# Patient Record
Sex: FEMALE | Race: WHITE | NOT HISPANIC OR LATINO | Employment: UNEMPLOYED | URBAN - NONMETROPOLITAN AREA
[De-identification: names, ages, dates, MRNs, and addresses within clinical notes are randomized per-mention and may not be internally consistent; named-entity substitution may affect disease eponyms.]

---

## 2020-07-09 ENCOUNTER — CLINICAL SUPPORT (OUTPATIENT)
Dept: CARDIOLOGY | Facility: CLINIC | Age: 35
End: 2020-07-09

## 2020-07-09 ENCOUNTER — OFFICE VISIT (OUTPATIENT)
Dept: CARDIOLOGY | Facility: CLINIC | Age: 35
End: 2020-07-09

## 2020-07-09 VITALS
DIASTOLIC BLOOD PRESSURE: 88 MMHG | WEIGHT: 154.2 LBS | HEART RATE: 74 BPM | OXYGEN SATURATION: 99 % | BODY MASS INDEX: 24.78 KG/M2 | HEIGHT: 66 IN | TEMPERATURE: 98.4 F | SYSTOLIC BLOOD PRESSURE: 134 MMHG

## 2020-07-09 DIAGNOSIS — R07.2 PRECORDIAL PAIN: ICD-10-CM

## 2020-07-09 DIAGNOSIS — F17.211 CIGARETTE NICOTINE DEPENDENCE IN REMISSION: ICD-10-CM

## 2020-07-09 DIAGNOSIS — Z95.0 PACEMAKER: ICD-10-CM

## 2020-07-09 DIAGNOSIS — I10 ESSENTIAL HYPERTENSION: ICD-10-CM

## 2020-07-09 DIAGNOSIS — G47.33 OSA (OBSTRUCTIVE SLEEP APNEA): ICD-10-CM

## 2020-07-09 DIAGNOSIS — R00.2 PALPITATIONS: Primary | ICD-10-CM

## 2020-07-09 DIAGNOSIS — R53.83 FATIGUE, UNSPECIFIED TYPE: ICD-10-CM

## 2020-07-09 DIAGNOSIS — I49.5 SSS (SICK SINUS SYNDROME) (HCC): Primary | ICD-10-CM

## 2020-07-09 DIAGNOSIS — R06.02 SHORTNESS OF BREATH: ICD-10-CM

## 2020-07-09 PROCEDURE — 93288 INTERROG EVL PM/LDLS PM IP: CPT | Performed by: INTERNAL MEDICINE

## 2020-07-09 PROCEDURE — 93000 ELECTROCARDIOGRAM COMPLETE: CPT | Performed by: SPECIALIST

## 2020-07-09 PROCEDURE — 99213 OFFICE O/P EST LOW 20 MIN: CPT | Performed by: SPECIALIST

## 2020-07-09 RX ORDER — GABAPENTIN 800 MG/1
800 TABLET ORAL 3 TIMES DAILY
COMMUNITY
End: 2022-04-25 | Stop reason: ALTCHOICE

## 2020-07-09 RX ORDER — PRAMIPEXOLE DIHYDROCHLORIDE 0.25 MG/1
0.5 TABLET ORAL
COMMUNITY
End: 2022-04-25 | Stop reason: ALTCHOICE

## 2020-07-09 RX ORDER — METOPROLOL TARTRATE 50 MG/1
50 TABLET, FILM COATED ORAL 2 TIMES DAILY
COMMUNITY
End: 2020-07-09 | Stop reason: SDUPTHER

## 2020-07-09 RX ORDER — METOPROLOL TARTRATE 50 MG/1
50 TABLET, FILM COATED ORAL 2 TIMES DAILY
Qty: 180 TABLET | Refills: 1 | Status: SHIPPED | OUTPATIENT
Start: 2020-07-09 | End: 2021-04-27 | Stop reason: SDUPTHER

## 2020-07-09 RX ORDER — CYCLOBENZAPRINE HCL 10 MG
10 TABLET ORAL 3 TIMES DAILY PRN
COMMUNITY
End: 2022-04-25 | Stop reason: ALTCHOICE

## 2020-07-09 NOTE — PROGRESS NOTES
Subjective   Follow up, pacemaker , Hypertenson  Antonette Martinez is a 35 y.o. female who presents to day for Irregular Heart Beat (EVALUATE) and Med Management (LIST).    CHIEF COMPLIANT  Chief Complaint   Patient presents with   • Irregular Heart Beat     EVALUATE   • Med Management     LIST       Active Problems:  Problem List Items Addressed This Visit        Cardiovascular and Mediastinum    Palpitations - Primary    Pacemaker    Essential hypertension    Relevant Medications    metoprolol tartrate (LOPRESSOR) 50 MG tablet       Respiratory    Shortness of breath    YARITZA (obstructive sleep apnea)       Nervous and Auditory    Precordial pain          HPI  HPI  He noticed recently that she is having more palpitations fast palpitations which happens about 2-3 times a day and loss of times with this palpitations she will have also chest tightness as if some one is sitting in her chest.  The palpitation takes few minutes to resolve sometimes she has to take a deep breath and to try to relax to get them off the palpitations sometimes start suddenly and sometimes starts as if that she is taken off with a speeding up it feels irregular and fades away slowly she also gets short of breath and dizziness with ringing in the ears during these episodes he feels very fatigued all of the time feels sleepy the insurance has taken her CPAP machine away she woke up few times choking  PRIOR MEDS  Current Outpatient Medications on File Prior to Visit   Medication Sig Dispense Refill   • cyclobenzaprine (FLEXERIL) 10 MG tablet Take 10 mg by mouth 3 (Three) Times a Day As Needed for Muscle Spasms.     • gabapentin (Neurontin) 800 MG tablet Take 800 mg by mouth 3 (Three) Times a Day.     • metoprolol tartrate (LOPRESSOR) 50 MG tablet Take 50 mg by mouth 2 (Two) Times a Day.     • pramipexole (MIRAPEX) 0.25 MG tablet Take 0.5 mg by mouth every night at bedtime.       No current facility-administered medications on file prior to visit.         ALLERGIES  Ambien [zolpidem tartrate]    HISTORY  Past Medical History:   Diagnosis Date   • Cardiac arrhythmia    • DDD (degenerative disc disease), lumbar    • Fibromyalgia    • Osteoporosis    • Pacemaker 2011       Social History     Socioeconomic History   • Marital status: Unknown     Spouse name: Not on file   • Number of children: Not on file   • Years of education: Not on file   • Highest education level: Not on file   Tobacco Use   • Smoking status: Former Smoker   • Smokeless tobacco: Never Used   Substance and Sexual Activity   • Alcohol use: Never     Frequency: Never   • Drug use: Never       Family History   Problem Relation Age of Onset   • Hypertension Mother    • Heart disease Mother    • Hypertension Father    • Hyperlipidemia Father    • Heart attack Maternal Grandmother    • Heart disease Maternal Grandmother    • Heart disease Maternal Grandfather    • Heart failure Maternal Grandfather    • Hypertension Maternal Grandfather        Review of Systems   Constitutional: Positive for fatigue. Negative for activity change and appetite change.        HEADACHES   HENT: Positive for tinnitus. Negative for congestion.    Eyes: Negative.  Negative for discharge and itching.   Respiratory: Positive for chest tightness and shortness of breath. Negative for apnea and choking.    Cardiovascular: Positive for palpitations. Negative for leg swelling.        SHORTNESS OF BREATH, SWELLING OF HANDS FEET AND ANKLES   Gastrointestinal: Negative.  Negative for abdominal distention and abdominal pain.   Endocrine: Positive for cold intolerance and heat intolerance.        EXCESSIVE THIRST   Genitourinary: Negative for flank pain.   Musculoskeletal: Negative.  Negative for neck stiffness.   Skin: Negative for color change and pallor.        BREAST LUMP   Allergic/Immunologic: Negative for immunocompromised state.        NOVOCAIN, AMBIEN   Neurological: Positive for dizziness, light-headedness, numbness and  "headaches.   Hematological: Bruises/bleeds easily.        PAST TRANSFUSION, ANEMIA   Psychiatric/Behavioral: The patient is nervous/anxious.         MEMORY LOSS       Objective     VITALS: /88   Pulse 74   Temp 98.4 °F (36.9 °C)   Ht 166.4 cm (65.5\")   Wt 69.9 kg (154 lb 3.2 oz)   SpO2 99%   BMI 25.27 kg/m²     LABS:   Lab Results (most recent)     None          IMAGING:   No Images in the past 120 days found..    EXAM:  Physical Exam   Constitutional: She is oriented to person, place, and time. She appears well-developed and well-nourished.   HENT:   Head: Normocephalic and atraumatic.   Eyes: Pupils are equal, round, and reactive to light.   Neck: Neck supple. No JVD present. No thyromegaly present.   Cardiovascular: Normal rate, regular rhythm, normal heart sounds and intact distal pulses. Exam reveals no gallop and no friction rub.   No murmur heard.  Pacer pocket clean and non tender   Pulmonary/Chest: Effort normal and breath sounds normal. No stridor. No respiratory distress. She has no wheezes. She has no rales. She exhibits no tenderness.   Musculoskeletal: She exhibits no edema, tenderness or deformity.   Neurological: She is alert and oriented to person, place, and time. No cranial nerve deficit. Coordination normal.   Skin: Skin is warm and dry.   Psychiatric: She has a normal mood and affect.   Vitals reviewed.      Procedure     ECG 12 Lead  Date/Time: 7/9/2020 10:00 AM  Performed by: Tami Gregory MD  Authorized by: Tami Gregory MD           EKG: Pacer atrial responses with diffuse nonspecific ST changes, no previous EKG for comparison is available       Assessment/Plan    Diagnosis Plan   1. Palpitations     2. Pacemaker     3. Precordial pain     4. Shortness of breath     5. Essential hypertension     6. YARITZA (obstructive sleep apnea)     1. Will get event monitor for assessment  2. Will proceed with stress testing to assess for chest pain  3. Blood pressure is controlled, will " continue current management  4. Worsening fatigue, will repeat sleep study  5. Will get lipid profile and TSH  6. She quitted 5/2020 advised to stay of tobacco  7. Will interrogate pacer    No follow-ups on file.    Antonette was seen today for irregular heart beat and med management.    Diagnoses and all orders for this visit:    Palpitations    Pacemaker    Precordial pain    Shortness of breath    Essential hypertension    YARITZA (obstructive sleep apnea)    Other orders  -     ECG 12 Lead                 MEDS ORDERED DURING VISIT:  No orders of the defined types were placed in this encounter.        This document has been electronically signed by Tami Gregory MD  July 9, 2020 10:31

## 2020-07-14 ENCOUNTER — TELEPHONE (OUTPATIENT)
Dept: CARDIOLOGY | Facility: CLINIC | Age: 35
End: 2020-07-14

## 2020-07-14 NOTE — TELEPHONE ENCOUNTER
Called pt to advise them they can come by the office any day in between 8 am and 11 am to have there monitor put on or we can mail it. If they want it mailed please confirm their address.     Called pt she stated to mail the monitor to her.

## 2020-07-20 PROCEDURE — 93270 REMOTE 30 DAY ECG REV/REPORT: CPT | Performed by: SPECIALIST

## 2020-08-06 ENCOUNTER — TREATMENT (OUTPATIENT)
Dept: CARDIOLOGY | Facility: CLINIC | Age: 35
End: 2020-08-06

## 2020-08-06 DIAGNOSIS — R00.2 PALPITATIONS: ICD-10-CM

## 2020-08-06 PROCEDURE — 93272 ECG/REVIEW INTERPRET ONLY: CPT | Performed by: SPECIALIST

## 2020-08-07 ENCOUNTER — APPOINTMENT (OUTPATIENT)
Dept: NUCLEAR MEDICINE | Facility: HOSPITAL | Age: 35
End: 2020-08-07

## 2020-08-20 ENCOUNTER — OFFICE VISIT (OUTPATIENT)
Dept: CARDIOLOGY | Facility: CLINIC | Age: 35
End: 2020-08-20

## 2020-08-20 VITALS
TEMPERATURE: 98.1 F | RESPIRATION RATE: 14 BRPM | HEART RATE: 80 BPM | HEIGHT: 66 IN | SYSTOLIC BLOOD PRESSURE: 123 MMHG | BODY MASS INDEX: 25.07 KG/M2 | WEIGHT: 156 LBS | DIASTOLIC BLOOD PRESSURE: 85 MMHG

## 2020-08-20 DIAGNOSIS — I10 ESSENTIAL HYPERTENSION: ICD-10-CM

## 2020-08-20 DIAGNOSIS — R06.02 SHORTNESS OF BREATH: ICD-10-CM

## 2020-08-20 DIAGNOSIS — Z95.0 PACEMAKER: ICD-10-CM

## 2020-08-20 DIAGNOSIS — R00.2 PALPITATIONS: ICD-10-CM

## 2020-08-20 DIAGNOSIS — R07.2 PRECORDIAL PAIN: Primary | ICD-10-CM

## 2020-08-20 PROCEDURE — 99213 OFFICE O/P EST LOW 20 MIN: CPT | Performed by: SPECIALIST

## 2020-08-20 NOTE — PROGRESS NOTES
Subjective   Follow up, palpitations  Antonette Martinez is a 35 y.o. female who presents to day for Follow-up (sleep study/ event monitor results) and Med Management (verbal).    CHIEF COMPLIANT  Chief Complaint   Patient presents with   • Follow-up     sleep study/ event monitor results   • Med Management     verbal       Active Problems:  Problem List Items Addressed This Visit        Cardiovascular and Mediastinum    Palpitations    Pacemaker    Essential hypertension       Respiratory    Shortness of breath       Nervous and Auditory    Precordial pain - Primary          HPI  HPI  She is doing better her palpitations are much improved since she has changed her diet she is still getting exertional chest pains when she does things like housework usually do not last long after she stops maybe few minutes on the day will quit because of the pain she started to have cutting down her activities to try to avoid episodes and with the chest pain she gets some shortness of breath to  PRIOR MEDS  Current Outpatient Medications on File Prior to Visit   Medication Sig Dispense Refill   • cyclobenzaprine (FLEXERIL) 10 MG tablet Take 10 mg by mouth 3 (Three) Times a Day As Needed for Muscle Spasms.     • gabapentin (Neurontin) 800 MG tablet Take 800 mg by mouth 3 (Three) Times a Day.     • metoprolol tartrate (LOPRESSOR) 50 MG tablet Take 1 tablet by mouth 2 (Two) Times a Day. 180 tablet 1   • pramipexole (MIRAPEX) 0.25 MG tablet Take 0.5 mg by mouth every night at bedtime.       No current facility-administered medications on file prior to visit.        ALLERGIES  Ambien [zolpidem tartrate]    HISTORY  Past Medical History:   Diagnosis Date   • Cardiac arrhythmia    • DDD (degenerative disc disease), lumbar    • Fibromyalgia    • Osteoporosis    • Pacemaker 2011       Social History     Socioeconomic History   • Marital status: Unknown     Spouse name: Not on file   • Number of children: Not on file   • Years of education: Not  "on file   • Highest education level: Not on file   Tobacco Use   • Smoking status: Former Smoker   • Smokeless tobacco: Never Used   Substance and Sexual Activity   • Alcohol use: Never     Frequency: Never   • Drug use: Never       Family History   Problem Relation Age of Onset   • Hypertension Mother    • Heart disease Mother    • Hypertension Father    • Hyperlipidemia Father    • Heart attack Maternal Grandmother    • Heart disease Maternal Grandmother    • Heart disease Maternal Grandfather    • Heart failure Maternal Grandfather    • Hypertension Maternal Grandfather        Review of Systems   Constitutional: Positive for activity change. Negative for appetite change.   HENT: Negative for congestion.    Eyes: Negative for discharge and itching.   Respiratory: Positive for chest tightness and shortness of breath. Negative for apnea, cough, choking, wheezing and stridor.    Cardiovascular: Positive for palpitations and leg swelling. Negative for chest pain.   Gastrointestinal: Negative for abdominal distention and abdominal pain.   Endocrine: Negative for cold intolerance and heat intolerance.   Genitourinary: Negative for flank pain.   Musculoskeletal: Negative for neck stiffness.   Skin: Negative for color change and pallor.   Allergic/Immunologic: Negative for immunocompromised state.   Neurological: Positive for dizziness. Negative for facial asymmetry.   Hematological: Does not bruise/bleed easily.   Psychiatric/Behavioral: Negative for agitation and behavioral problems.       Objective     VITALS: /85 (BP Location: Left arm, Patient Position: Sitting, Cuff Size: Adult)   Pulse 80   Temp 98.1 °F (36.7 °C) (Temporal)   Resp 14   Ht 166.4 cm (65.51\")   Wt 70.8 kg (156 lb)   BMI 25.56 kg/m²     LABS:   Lab Results (most recent)     None          IMAGING:   No Images in the past 120 days found..    EXAM:  Physical Exam   Constitutional: She is oriented to person, place, and time. She appears " well-developed and well-nourished.   HENT:   Head: Normocephalic and atraumatic.   Eyes: Pupils are equal, round, and reactive to light.   Neck: Neck supple. No JVD present. No thyromegaly present.   Cardiovascular: Normal rate, regular rhythm, normal heart sounds and intact distal pulses. Exam reveals no gallop and no friction rub.   No murmur heard.  Pacer pocket clean and nontender   Pulmonary/Chest: Effort normal and breath sounds normal. No stridor. No respiratory distress. She has no wheezes. She has no rales. She exhibits no tenderness.   Musculoskeletal: She exhibits no edema, tenderness or deformity.   Neurological: She is alert and oriented to person, place, and time. No cranial nerve deficit. Coordination normal.   Skin: Skin is warm and dry.   Psychiatric: She has a normal mood and affect.   Vitals reviewed.      Procedure   Procedures       Assessment/Plan    Diagnosis Plan   1. Precordial pain     2. Palpitations     3. Pacemaker     4. Essential hypertension     5. Shortness of breath     1.  She still have some exertional chest pain however she could not do the stress test yet so we will proceed with stress testing  2.  I discussed the event monitor which showed rare premature ventricular complexes she is actually doing a lot better since changing her diet and cut down the carbohydrate intake  3.  The pacemaker was interrogated with good function  4.  Blood pressures well controlled  5.  Still no labs available we will try to get the labs    No follow-ups on file.    Antonette was seen today for follow-up and med management.    Diagnoses and all orders for this visit:    Precordial pain    Palpitations    Pacemaker    Essential hypertension    Shortness of breath                 MEDS ORDERED DURING VISIT:  No orders of the defined types were placed in this encounter.        This document has been electronically signed by Tami Gregory MD  August 20, 2020 12:02

## 2020-08-21 ENCOUNTER — TELEPHONE (OUTPATIENT)
Dept: CARDIOLOGY | Facility: CLINIC | Age: 35
End: 2020-08-21

## 2020-08-21 NOTE — TELEPHONE ENCOUNTER
Called PCP and spoke with Bri. She is faxing her labs.         ----- Message from Tami Gregory MD sent at 8/20/2020 12:03 PM EDT -----  Recent lab results please

## 2020-09-01 ENCOUNTER — HOSPITAL ENCOUNTER (OUTPATIENT)
Dept: CARDIOLOGY | Facility: HOSPITAL | Age: 35
End: 2020-09-01

## 2021-01-18 PROCEDURE — 93294 REM INTERROG EVL PM/LDLS PM: CPT | Performed by: INTERNAL MEDICINE

## 2021-01-22 ENCOUNTER — TREATMENT (OUTPATIENT)
Dept: CARDIOLOGY | Facility: CLINIC | Age: 36
End: 2021-01-22

## 2021-01-22 DIAGNOSIS — I49.5 SSS (SICK SINUS SYNDROME) (HCC): Primary | ICD-10-CM

## 2021-01-22 PROCEDURE — 93296 REM INTERROG EVL PM/IDS: CPT | Performed by: INTERNAL MEDICINE

## 2021-04-20 ENCOUNTER — TELEPHONE (OUTPATIENT)
Dept: CARDIOLOGY | Facility: CLINIC | Age: 36
End: 2021-04-20

## 2021-04-20 NOTE — TELEPHONE ENCOUNTER
Called patient for pacer interr appt. She states is having a lot of palpitations for a few weeks now and is concerned and wants to see provider. After discussing this matter with BONI De Jesus, he states we do need pacer interr first then will decide if there is urgency/indication of need for physician visit or if we can wait until original scheduled appt on 05/24/2021.  Patient agreed to interr appt 04/21/2021 at 2:10pm.

## 2021-04-21 ENCOUNTER — CLINICAL SUPPORT (OUTPATIENT)
Dept: CARDIOLOGY | Facility: CLINIC | Age: 36
End: 2021-04-21

## 2021-04-21 DIAGNOSIS — I49.5 SSS (SICK SINUS SYNDROME) (HCC): Primary | ICD-10-CM

## 2021-04-21 PROCEDURE — 93279 PRGRMG DEV EVAL PM/LDLS PM: CPT | Performed by: INTERNAL MEDICINE

## 2021-04-27 ENCOUNTER — OFFICE VISIT (OUTPATIENT)
Dept: CARDIOLOGY | Facility: CLINIC | Age: 36
End: 2021-04-27

## 2021-04-27 VITALS
WEIGHT: 163.4 LBS | HEART RATE: 77 BPM | BODY MASS INDEX: 25.65 KG/M2 | DIASTOLIC BLOOD PRESSURE: 84 MMHG | RESPIRATION RATE: 16 BRPM | TEMPERATURE: 97.8 F | HEIGHT: 67 IN | SYSTOLIC BLOOD PRESSURE: 131 MMHG

## 2021-04-27 DIAGNOSIS — G47.33 OSA (OBSTRUCTIVE SLEEP APNEA): ICD-10-CM

## 2021-04-27 DIAGNOSIS — F17.211 CIGARETTE NICOTINE DEPENDENCE IN REMISSION: ICD-10-CM

## 2021-04-27 DIAGNOSIS — R55 SYNCOPE AND COLLAPSE: Primary | ICD-10-CM

## 2021-04-27 DIAGNOSIS — I10 ESSENTIAL HYPERTENSION: ICD-10-CM

## 2021-04-27 DIAGNOSIS — R06.02 SHORTNESS OF BREATH: ICD-10-CM

## 2021-04-27 DIAGNOSIS — R07.2 PRECORDIAL PAIN: ICD-10-CM

## 2021-04-27 DIAGNOSIS — R00.2 PALPITATIONS: ICD-10-CM

## 2021-04-27 DIAGNOSIS — Z95.0 PACEMAKER: ICD-10-CM

## 2021-04-27 PROCEDURE — 99214 OFFICE O/P EST MOD 30 MIN: CPT | Performed by: SPECIALIST

## 2021-04-27 PROCEDURE — 93000 ELECTROCARDIOGRAM COMPLETE: CPT | Performed by: SPECIALIST

## 2021-04-27 RX ORDER — BACLOFEN 10 MG/1
10 TABLET ORAL 3 TIMES DAILY
COMMUNITY
End: 2022-04-25 | Stop reason: ALTCHOICE

## 2021-04-27 RX ORDER — AMOXICILLIN 875 MG/1
875 TABLET, COATED ORAL 2 TIMES DAILY
COMMUNITY
End: 2022-04-25 | Stop reason: ALTCHOICE

## 2021-04-27 RX ORDER — DICYCLOMINE HYDROCHLORIDE 10 MG/1
10 CAPSULE ORAL
COMMUNITY
End: 2022-04-25 | Stop reason: ALTCHOICE

## 2021-04-27 RX ORDER — LISINOPRIL 5 MG/1
5 TABLET ORAL DAILY
COMMUNITY
End: 2021-04-27 | Stop reason: SDUPTHER

## 2021-04-27 RX ORDER — METOPROLOL TARTRATE 50 MG/1
50 TABLET, FILM COATED ORAL 2 TIMES DAILY
Qty: 180 TABLET | Refills: 1 | Status: SHIPPED | OUTPATIENT
Start: 2021-04-27 | End: 2022-04-25 | Stop reason: SDUPTHER

## 2021-04-27 RX ORDER — ASPIRIN 81 MG/1
81 TABLET ORAL DAILY
COMMUNITY
End: 2022-05-17

## 2021-04-27 RX ORDER — LISINOPRIL 5 MG/1
5 TABLET ORAL DAILY
Qty: 90 TABLET | Refills: 1 | Status: SHIPPED | OUTPATIENT
Start: 2021-04-27 | End: 2022-04-25 | Stop reason: SDUPTHER

## 2021-04-27 NOTE — PROGRESS NOTES
Subjective   Follow up, pacemaker, palpitations  Antonette Martinez is a 35 y.o. female who presents to day for Follow-up (pacer interr), Chest Pain, Shortness of Breath, Palpitations, and Med Management (list provided).    CHIEF COMPLIANT  Chief Complaint   Patient presents with   • Follow-up     pacer interr   • Chest Pain   • Shortness of Breath   • Palpitations   • Med Management     list provided       Active Problems:  Problem List Items Addressed This Visit        Cardiac and Vasculature    Palpitations    Relevant Medications    metoprolol tartrate (LOPRESSOR) 50 MG tablet    Other Relevant Orders    Stress Test With Myocardial Perfusion One Day    Adult Transthoracic Echo Complete w/ Color, Spectral and Contrast if necessary per protocol    Cardiac Event Monitor    Comprehensive Metabolic Panel    Pacemaker    Relevant Orders    Cardiac Event Monitor    Precordial pain    Relevant Orders    Stress Test With Myocardial Perfusion One Day    Adult Transthoracic Echo Complete w/ Color, Spectral and Contrast if necessary per protocol    D-dimer, Quantitative    Lipid Panel    Essential hypertension    Relevant Medications    lisinopril (PRINIVIL,ZESTRIL) 5 MG tablet    metoprolol tartrate (LOPRESSOR) 50 MG tablet    Other Relevant Orders    Comprehensive Metabolic Panel       Pulmonary and Pneumonias    Shortness of breath    Relevant Orders    Stress Test With Myocardial Perfusion One Day    Adult Transthoracic Echo Complete w/ Color, Spectral and Contrast if necessary per protocol    D-dimer, Quantitative       Sleep    YARITZA (obstructive sleep apnea)       Tobacco    Cigarette nicotine dependence in remission      Other Visit Diagnoses     Syncope and collapse    -  Primary    Relevant Orders    Stress Test With Myocardial Perfusion One Day    Adult Transthoracic Echo Complete w/ Color, Spectral and Contrast if necessary per protocol    Cardiac Event Monitor          HPI  HPI  Still with occasional fast  palpitations, one episode was associated with syncope 2 weeks ago, also worsening shortness of breath, she noticed she gets palpitations and dizziness with mild to moderate exertion, she has edema intermittently, still with intermittent retrosternal chest pain, no relation to food or exercise  When she passed out 2 weeks ago, she was walking , heart was racing for few seconds, she only had palpitations, no chest pain, nor other symptoms  PRIOR MEDS  Current Outpatient Medications on File Prior to Visit   Medication Sig Dispense Refill   • amoxicillin (AMOXIL) 875 MG tablet Take 875 mg by mouth 2 (Two) Times a Day.     • aspirin 81 MG EC tablet Take 81 mg by mouth Daily.     • baclofen (LIORESAL) 10 MG tablet Take 10 mg by mouth 3 (Three) Times a Day.     • cyclobenzaprine (FLEXERIL) 10 MG tablet Take 10 mg by mouth 3 (Three) Times a Day As Needed for Muscle Spasms.     • Diclofenac Sodium (VOLTAREN) 1 % gel gel Apply 4 g topically to the appropriate area as directed 4 (Four) Times a Day As Needed.     • dicyclomine (BENTYL) 10 MG capsule Take 10 mg by mouth 4 (Four) Times a Day Before Meals & at Bedtime.     • gabapentin (Neurontin) 800 MG tablet Take 800 mg by mouth 3 (Three) Times a Day.     • hydrocortisone 2.5 % cream Apply  topically to the appropriate area as directed 2 (Two) Times a Day.     • [DISCONTINUED] lisinopril (PRINIVIL,ZESTRIL) 5 MG tablet Take 5 mg by mouth Daily.     • [DISCONTINUED] metoprolol tartrate (LOPRESSOR) 50 MG tablet Take 1 tablet by mouth 2 (Two) Times a Day. 180 tablet 1   • pramipexole (MIRAPEX) 0.25 MG tablet Take 0.5 mg by mouth every night at bedtime.       No current facility-administered medications on file prior to visit.       ALLERGIES  Ambien [zolpidem tartrate]    HISTORY  Past Medical History:   Diagnosis Date   • Cardiac arrhythmia    • Chronic kidney disease     hx of renal failure x2   • DDD (degenerative disc disease), lumbar    • Fibromyalgia    • Heart murmur    •  "Hypertension    • Mitral valve prolapse    • Osteoporosis    • Pacemaker        Social History     Socioeconomic History   • Marital status: Unknown     Spouse name: Not on file   • Number of children: Not on file   • Years of education: Not on file   • Highest education level: Not on file   Tobacco Use   • Smoking status: Former Smoker     Packs/day: 0.50     Types: Cigarettes     Quit date: 2020     Years since quittin.7   • Smokeless tobacco: Never Used   Substance and Sexual Activity   • Alcohol use: Never   • Drug use: Never       Family History   Problem Relation Age of Onset   • Hypertension Mother    • Arrhythmia Mother    • Hypertension Father    • Hyperlipidemia Father    • Heart attack Maternal Grandmother    • Heart disease Maternal Grandmother    • Heart disease Maternal Grandfather    • Heart failure Maternal Grandfather    • Hypertension Maternal Grandfather        Review of Systems   Constitutional: Negative for activity change and appetite change.   Respiratory: Positive for chest tightness and shortness of breath. Negative for apnea, cough, choking, wheezing and stridor.    Cardiovascular: Positive for chest pain, palpitations and leg swelling.   Gastrointestinal: Negative for abdominal distention and abdominal pain.   Skin: Negative for color change.   Psychiatric/Behavioral: Negative for agitation and behavioral problems.       Objective     VITALS: /84   Pulse 77   Temp 97.8 °F (36.6 °C)   Resp 16   Ht 170.2 cm (67\")   Wt 74.1 kg (163 lb 6.4 oz)   BMI 25.59 kg/m²     LABS:   Lab Results (most recent)     None          IMAGING:   No Images in the past 120 days found..    EXAM:  Physical Exam  Vitals reviewed.   Constitutional:       Appearance: She is well-developed.   HENT:      Head: Normocephalic and atraumatic.   Eyes:      Pupils: Pupils are equal, round, and reactive to light.   Neck:      Thyroid: No thyromegaly.      Vascular: No JVD.   Cardiovascular:      Rate and " Rhythm: Normal rate and regular rhythm.      Heart sounds: Normal heart sounds. No murmur heard.   No friction rub. No gallop.       Comments: Pacer pocket is clean and tender  Pulmonary:      Effort: Pulmonary effort is normal. No respiratory distress.      Breath sounds: Normal breath sounds. No stridor. No wheezing or rales.   Chest:      Chest wall: No tenderness.   Musculoskeletal:         General: No tenderness or deformity.      Cervical back: Neck supple.   Skin:     General: Skin is warm and dry.   Neurological:      Mental Status: She is alert and oriented to person, place, and time.      Cranial Nerves: No cranial nerve deficit.      Coordination: Coordination normal.         Procedure     ECG 12 Lead    Date/Time: 4/27/2021 8:53 AM  Performed by: Tami Gregory MD  Authorized by: Tami Gregory MD           EKG: Paced atrial responses with diffuse nonspecific ST changes with normal response to magnet, compare with EKG on 7/9/2020 no significant changes       Assessment/Plan     Diagnoses and all orders for this visit:    1. Syncope and collapse (Primary)  -     Stress Test With Myocardial Perfusion One Day; Future  -     Adult Transthoracic Echo Complete w/ Color, Spectral and Contrast if necessary per protocol; Future  -     Cardiac Event Monitor; Future    2. Pacemaker  -     Cardiac Event Monitor; Future    3. Palpitations  -     Stress Test With Myocardial Perfusion One Day; Future  -     Adult Transthoracic Echo Complete w/ Color, Spectral and Contrast if necessary per protocol; Future  -     Cardiac Event Monitor; Future  -     Comprehensive Metabolic Panel; Future  -     metoprolol tartrate (LOPRESSOR) 50 MG tablet; Take 1 tablet by mouth 2 (Two) Times a Day.  Dispense: 180 tablet; Refill: 1    4. Precordial pain  -     Stress Test With Myocardial Perfusion One Day; Future  -     Adult Transthoracic Echo Complete w/ Color, Spectral and Contrast if necessary per protocol; Future  -     D-dimer,  Quantitative; Future  -     Lipid Panel; Future    5. Essential hypertension  -     Comprehensive Metabolic Panel; Future  -     lisinopril (PRINIVIL,ZESTRIL) 5 MG tablet; Take 1 tablet by mouth Daily.  Dispense: 90 tablet; Refill: 1    6. Shortness of breath  -     Stress Test With Myocardial Perfusion One Day; Future  -     Adult Transthoracic Echo Complete w/ Color, Spectral and Contrast if necessary per protocol; Future  -     D-dimer, Quantitative; Future    7. YARITZA (obstructive sleep apnea)    8. Cigarette nicotine dependence in remission    Other orders  -     ECG 12 Lead    1.  She had 1 syncopal episode associated with the tachycardia I reviewed the pacemaker interrogation on 4/21/2021 and that showed an episode of tachycardia is about 140 with 2 nonsustained episodes, these are not sufficient for her to pass out so is not clear what is the reason so far she has also some other symptoms including worsening shortness of breath and intermittent chest pains so I am going to proceed with an event monitor for 1 month also get an echocardiogram to assess her cardiac function wall motion and valve morphology as well as proceeding with stress testing for assessment of ischemia, will get also D-dimer and if this is significantly elevated we may have to consider CT scan of the chest, she was advised not to drive  2.  As mentioned above the pacemaker was interrogated with normal function and relatively brief episodes of tachycardia up to 140 bpm so we will proceed as above  3.  His blood pressures well controlled we will continue current management, apparently when she passed out she managed to walk to the car with her mom who was driving the car she was in the passenger seat when she actually passed out so that here to the episodes are related to postural hypotension  4.  There is no labs recently so we will proceed with getting labs including lipids and CMP  5.  She does not use CPAP as the CPAP machine was taken  away but she was currently  6.  She still not smoking    Return in about 4 weeks (around 5/25/2021).    Antonette Martinez  reports that she quit smoking about 8 months ago. Her smoking use included cigarettes. She smoked 0.50 packs per day. She has never used smokeless tobacco.. I have educated her on the risk of diseases from using tobacco products such as cancer, COPD and heart disease.                    Patient's Body mass index is 25.59 kg/m². BMI is within normal parameters. No follow-up required..           MEDS ORDERED DURING VISIT:  New Medications Ordered This Visit   Medications   • lisinopril (PRINIVIL,ZESTRIL) 5 MG tablet     Sig: Take 1 tablet by mouth Daily.     Dispense:  90 tablet     Refill:  1   • metoprolol tartrate (LOPRESSOR) 50 MG tablet     Sig: Take 1 tablet by mouth 2 (Two) Times a Day.     Dispense:  180 tablet     Refill:  1       As always, Butch Martin MD  I appreciate very much the opportunity to participate in the cardiovascular care of your patients. Please do not hesitate to call me with any questions with regards to Antonette Martinez evaluation and management.         This document has been electronically signed by Tami Gregory MD  April 27, 2021 09:32 EDT

## 2021-05-07 ENCOUNTER — TELEPHONE (OUTPATIENT)
Dept: CARDIOLOGY | Facility: CLINIC | Age: 36
End: 2021-05-07

## 2021-05-07 NOTE — TELEPHONE ENCOUNTER
Tried to michael patient to tell her we have her monitor authorized.  She will have to have a [employer] Event Monitor.  No answer. VM full

## 2021-06-18 ENCOUNTER — APPOINTMENT (OUTPATIENT)
Dept: CARDIOLOGY | Facility: HOSPITAL | Age: 36
End: 2021-06-18

## 2021-06-21 PROCEDURE — 93228 REMOTE 30 DAY ECG REV/REPORT: CPT | Performed by: SPECIALIST

## 2021-06-22 ENCOUNTER — TREATMENT (OUTPATIENT)
Dept: CARDIOLOGY | Facility: CLINIC | Age: 36
End: 2021-06-22

## 2021-06-22 DIAGNOSIS — R55 SYNCOPE AND COLLAPSE: ICD-10-CM

## 2021-06-22 DIAGNOSIS — R00.2 PALPITATIONS: ICD-10-CM

## 2021-06-22 DIAGNOSIS — Z95.0 PACEMAKER: ICD-10-CM

## 2021-07-19 ENCOUNTER — TREATMENT (OUTPATIENT)
Dept: CARDIOLOGY | Facility: CLINIC | Age: 36
End: 2021-07-19

## 2021-07-19 DIAGNOSIS — I49.5 SSS (SICK SINUS SYNDROME) (HCC): Primary | ICD-10-CM

## 2021-07-19 PROCEDURE — 93296 REM INTERROG EVL PM/IDS: CPT | Performed by: INTERNAL MEDICINE

## 2021-07-19 PROCEDURE — 93294 REM INTERROG EVL PM/LDLS PM: CPT | Performed by: INTERNAL MEDICINE

## 2021-10-18 ENCOUNTER — TREATMENT (OUTPATIENT)
Dept: CARDIOLOGY | Facility: CLINIC | Age: 36
End: 2021-10-18

## 2021-10-18 DIAGNOSIS — I49.5 SSS (SICK SINUS SYNDROME) (HCC): Primary | ICD-10-CM

## 2021-10-18 PROCEDURE — 93296 REM INTERROG EVL PM/IDS: CPT | Performed by: INTERNAL MEDICINE

## 2021-10-18 PROCEDURE — 93294 REM INTERROG EVL PM/LDLS PM: CPT | Performed by: INTERNAL MEDICINE

## 2022-01-05 ENCOUNTER — CLINICAL SUPPORT (OUTPATIENT)
Dept: CARDIOLOGY | Facility: CLINIC | Age: 37
End: 2022-01-05

## 2022-01-05 DIAGNOSIS — I49.5 SSS (SICK SINUS SYNDROME): Primary | ICD-10-CM

## 2022-01-05 PROCEDURE — 93288 INTERROG EVL PM/LDLS PM IP: CPT | Performed by: INTERNAL MEDICINE

## 2022-04-25 ENCOUNTER — OFFICE VISIT (OUTPATIENT)
Dept: CARDIOLOGY | Facility: CLINIC | Age: 37
End: 2022-04-25

## 2022-04-25 VITALS
WEIGHT: 153 LBS | TEMPERATURE: 97.5 F | HEART RATE: 74 BPM | HEIGHT: 67 IN | SYSTOLIC BLOOD PRESSURE: 121 MMHG | BODY MASS INDEX: 24.01 KG/M2 | DIASTOLIC BLOOD PRESSURE: 82 MMHG | RESPIRATION RATE: 16 BRPM

## 2022-04-25 DIAGNOSIS — R00.2 PALPITATIONS: Primary | ICD-10-CM

## 2022-04-25 DIAGNOSIS — F17.211 CIGARETTE NICOTINE DEPENDENCE IN REMISSION: ICD-10-CM

## 2022-04-25 DIAGNOSIS — Z95.0 PACEMAKER: ICD-10-CM

## 2022-04-25 DIAGNOSIS — R06.02 SHORTNESS OF BREATH: ICD-10-CM

## 2022-04-25 DIAGNOSIS — I10 ESSENTIAL HYPERTENSION: ICD-10-CM

## 2022-04-25 DIAGNOSIS — G47.33 OSA (OBSTRUCTIVE SLEEP APNEA): ICD-10-CM

## 2022-04-25 PROCEDURE — 99214 OFFICE O/P EST MOD 30 MIN: CPT | Performed by: SPECIALIST

## 2022-04-25 PROCEDURE — 93000 ELECTROCARDIOGRAM COMPLETE: CPT | Performed by: SPECIALIST

## 2022-04-25 RX ORDER — METOPROLOL TARTRATE 50 MG/1
50 TABLET, FILM COATED ORAL 2 TIMES DAILY
Qty: 180 TABLET | Refills: 1 | Status: SHIPPED | OUTPATIENT
Start: 2022-04-25 | End: 2022-05-31 | Stop reason: SDUPTHER

## 2022-04-25 RX ORDER — LISINOPRIL 5 MG/1
5 TABLET ORAL DAILY
Qty: 90 TABLET | Refills: 1 | Status: SHIPPED | OUTPATIENT
Start: 2022-04-25 | End: 2022-08-09 | Stop reason: SDUPTHER

## 2022-04-25 NOTE — PROGRESS NOTES
Subjective   Follow up, татьянаpedro luis Martinez is a 36 y.o. female who presents to day for Follow-up (Pacer interr), Chest Pain (occas), Palpitations (Races,skips,fluttters), Shortness of Breath (Routine activity), Med Management (verbal), and Fatigue (excess).    CHIEF COMPLIANT  Chief Complaint   Patient presents with   • Follow-up     Pacer interr   • Chest Pain     occas   • Palpitations     Races,skips,fluttters   • Shortness of Breath     Routine activity   • Med Management     verbal   • Fatigue     excess       Active Problems:  Problem List Items Addressed This Visit        Cardiac and Vasculature    Palpitations - Primary    Relevant Medications    metoprolol tartrate (LOPRESSOR) 50 MG tablet    Other Relevant Orders    ECG 12 Lead    Cardiac Event Monitor    Pacemaker    Essential hypertension    Relevant Medications    metoprolol tartrate (LOPRESSOR) 50 MG tablet    lisinopril (PRINIVIL,ZESTRIL) 5 MG tablet       Pulmonary and Pneumonias    Shortness of breath       Sleep    YARITZA (obstructive sleep apnea)       Tobacco    Cigarette nicotine dependence in remission          HPI  HPI  Now her palpitations worse this happens almost on a daily basis which feel her heart is racing.  But no further syncope otherwise she is going to have a repeat sleep study with Dr. Cueto she is not been using CPAP still not smoking  PRIOR MEDS  Current Outpatient Medications on File Prior to Visit   Medication Sig Dispense Refill   • aspirin 81 MG EC tablet Take 81 mg by mouth Daily.     • [DISCONTINUED] lisinopril (PRINIVIL,ZESTRIL) 5 MG tablet Take 1 tablet by mouth Daily. 90 tablet 1   • [DISCONTINUED] metoprolol tartrate (LOPRESSOR) 50 MG tablet Take 1 tablet by mouth 2 (Two) Times a Day. 180 tablet 1   • [DISCONTINUED] amoxicillin (AMOXIL) 875 MG tablet Take 875 mg by mouth 2 (Two) Times a Day.     • [DISCONTINUED] baclofen (LIORESAL) 10 MG tablet Take 10 mg by mouth 3 (Three) Times a Day.     • [DISCONTINUED]  cyclobenzaprine (FLEXERIL) 10 MG tablet Take 10 mg by mouth 3 (Three) Times a Day As Needed for Muscle Spasms.     • [DISCONTINUED] Diclofenac Sodium (VOLTAREN) 1 % gel gel Apply 4 g topically to the appropriate area as directed 4 (Four) Times a Day As Needed.     • [DISCONTINUED] dicyclomine (BENTYL) 10 MG capsule Take 10 mg by mouth 4 (Four) Times a Day Before Meals & at Bedtime.     • [DISCONTINUED] gabapentin (NEURONTIN) 800 MG tablet Take 800 mg by mouth 3 (Three) Times a Day.     • [DISCONTINUED] hydrocortisone 2.5 % cream Apply  topically to the appropriate area as directed 2 (Two) Times a Day.     • [DISCONTINUED] pramipexole (MIRAPEX) 0.25 MG tablet Take 0.5 mg by mouth every night at bedtime.       No current facility-administered medications on file prior to visit.       ALLERGIES  Ambien [zolpidem tartrate]    HISTORY  Past Medical History:   Diagnosis Date   • Cardiac arrhythmia    • Chronic kidney disease     hx of renal failure x2   • DDD (degenerative disc disease), lumbar    • Fibromyalgia    • Heart murmur    • Hypertension    • Mitral valve prolapse    • Osteoporosis    • Pacemaker        Social History     Socioeconomic History   • Marital status: Unknown   Tobacco Use   • Smoking status: Former Smoker     Packs/day: 0.50     Types: Cigarettes     Quit date: 2020     Years since quittin.7   • Smokeless tobacco: Never Used   Substance and Sexual Activity   • Alcohol use: Never   • Drug use: Never       Family History   Problem Relation Age of Onset   • Hypertension Mother    • Arrhythmia Mother    • Hypertension Father    • Hyperlipidemia Father    • Heart attack Maternal Grandmother    • Heart disease Maternal Grandmother    • Heart disease Maternal Grandfather    • Heart failure Maternal Grandfather    • Hypertension Maternal Grandfather        Review of Systems   Respiratory: Positive for shortness of breath. Negative for apnea, cough, choking, chest tightness and stridor.   "  Cardiovascular: Positive for palpitations. Negative for chest pain and leg swelling.       Objective     VITALS: /82   Pulse 74   Temp 97.5 °F (36.4 °C)   Resp 16   Ht 170.2 cm (67\")   Wt 69.4 kg (153 lb)   BMI 23.96 kg/m²     LABS:   Lab Results (most recent)     None          IMAGING:   No Images in the past 120 days found..    EXAM:  Physical Exam  Vitals reviewed.   Constitutional:       Appearance: She is well-developed.   HENT:      Head: Normocephalic and atraumatic.   Eyes:      Pupils: Pupils are equal, round, and reactive to light.   Neck:      Thyroid: No thyromegaly.      Vascular: No JVD.   Cardiovascular:      Rate and Rhythm: Normal rate and regular rhythm.      Heart sounds: Normal heart sounds. No murmur heard.    No friction rub. No gallop.      Comments: Pacer pocket nontender  Pulmonary:      Effort: Pulmonary effort is normal. No respiratory distress.      Breath sounds: Normal breath sounds. No stridor. No wheezing or rales.   Chest:      Chest wall: No tenderness.   Musculoskeletal:         General: No tenderness or deformity.      Cervical back: Neck supple.   Skin:     General: Skin is warm and dry.   Neurological:      Mental Status: She is alert and oriented to person, place, and time.      Cranial Nerves: No cranial nerve deficit.      Coordination: Coordination normal.         Procedure     ECG 12 Lead    Date/Time: 4/25/2022 2:21 PM  Performed by: Tami Gregory MD  Authorized by: Tami Gregory MD           EKG: Paced atrial response while diffuse nonspecific ST-T changes with normal response to the magnet comparing with EKG on 4/27/2021 no significant change       Assessment/Plan     Diagnoses and all orders for this visit:    1. Palpitations (Primary)  -     ECG 12 Lead  -     metoprolol tartrate (LOPRESSOR) 50 MG tablet; Take 1 tablet by mouth 2 (Two) Times a Day.  Dispense: 180 tablet; Refill: 1  -     Cardiac Event Monitor; Future    2. Essential hypertension  -     " lisinopril (PRINIVIL,ZESTRIL) 5 MG tablet; Take 1 tablet by mouth Daily.  Dispense: 90 tablet; Refill: 1    3. Pacemaker    4. Shortness of breath    5. YARITZA (obstructive sleep apnea)    6. Cigarette nicotine dependence in remission    1.  Now patient is having more frequent palpitations I reviewed the Holter monitor which showed lack possible atrial fibrillation versus flutter versus sinus tachycardia and only 1 tracing, on the pacemaker also interrogation to 22 she is having several episodes of tachycardia I am going to try to repeat the event monitor to try to get a better tracing as if the patient is having atrial fibrillation she probably has to be committed for anticoagulation therapy  2.  Her blood pressure is well controlled we will continue current management  3.  Pacemaker is interrogated as mentioned above with good function is a AAIR mode  4.  Patient is going to have a repeat sleep study with Dr. Cueto currently she is not using CPAP  5.  She still not smoking  6.  No recent blood work done we will try to get labs prior to next visit      Return in about 4 weeks (around 5/23/2022).             MEDS ORDERED DURING VISIT:  New Medications Ordered This Visit   Medications   • metoprolol tartrate (LOPRESSOR) 50 MG tablet     Sig: Take 1 tablet by mouth 2 (Two) Times a Day.     Dispense:  180 tablet     Refill:  1   • lisinopril (PRINIVIL,ZESTRIL) 5 MG tablet     Sig: Take 1 tablet by mouth Daily.     Dispense:  90 tablet     Refill:  1       As always, Butch Martin MD  I appreciate very much the opportunity to participate in the cardiovascular care of your patients. Please do not hesitate to call me with any questions with regards to Antonette Martinez evaluation and management.         This document has been electronically signed by Tami Gregory MD  April 25, 2022 15:50 EDT

## 2022-05-02 PROCEDURE — 93246 EXT ECG>7D<15D RECORDING: CPT | Performed by: SPECIALIST

## 2022-05-17 ENCOUNTER — TELEPHONE (OUTPATIENT)
Dept: CARDIOLOGY | Facility: CLINIC | Age: 37
End: 2022-05-17

## 2022-05-17 ENCOUNTER — TREATMENT (OUTPATIENT)
Dept: CARDIOLOGY | Facility: CLINIC | Age: 37
End: 2022-05-17

## 2022-05-17 DIAGNOSIS — I48.0 PAROXYSMAL ATRIAL FIBRILLATION: Primary | ICD-10-CM

## 2022-05-17 DIAGNOSIS — R00.2 PALPITATIONS: ICD-10-CM

## 2022-05-17 PROCEDURE — 93248 EXT ECG>7D<15D REV&INTERPJ: CPT | Performed by: SPECIALIST

## 2022-05-17 NOTE — TELEPHONE ENCOUNTER
I did call the patient as her monitor showed atrial fibrillation she is [id number] score of 1 so we will start her on Eliquis 5 mg twice daily also she is going to be referred to electrophysiology services for possible pulmonary vein isolation and radiofrequency ablation

## 2022-05-31 ENCOUNTER — OFFICE VISIT (OUTPATIENT)
Dept: CARDIOLOGY | Facility: CLINIC | Age: 37
End: 2022-05-31

## 2022-05-31 ENCOUNTER — TELEPHONE (OUTPATIENT)
Dept: CARDIOLOGY | Facility: CLINIC | Age: 37
End: 2022-05-31

## 2022-05-31 VITALS
TEMPERATURE: 97 F | OXYGEN SATURATION: 98 % | HEIGHT: 67 IN | BODY MASS INDEX: 22.6 KG/M2 | WEIGHT: 144 LBS | DIASTOLIC BLOOD PRESSURE: 77 MMHG | SYSTOLIC BLOOD PRESSURE: 133 MMHG | HEART RATE: 74 BPM

## 2022-05-31 DIAGNOSIS — I10 ESSENTIAL HYPERTENSION: ICD-10-CM

## 2022-05-31 DIAGNOSIS — I48.0 AF (PAROXYSMAL ATRIAL FIBRILLATION): Primary | ICD-10-CM

## 2022-05-31 DIAGNOSIS — R00.2 PALPITATIONS: ICD-10-CM

## 2022-05-31 DIAGNOSIS — R53.83 FATIGUE, UNSPECIFIED TYPE: ICD-10-CM

## 2022-05-31 DIAGNOSIS — R07.2 PRECORDIAL PAIN: ICD-10-CM

## 2022-05-31 DIAGNOSIS — R06.02 SHORTNESS OF BREATH: ICD-10-CM

## 2022-05-31 DIAGNOSIS — Z95.0 PACEMAKER: ICD-10-CM

## 2022-05-31 DIAGNOSIS — R55 SYNCOPE AND COLLAPSE: ICD-10-CM

## 2022-05-31 DIAGNOSIS — I48.0 PAROXYSMAL ATRIAL FIBRILLATION: ICD-10-CM

## 2022-05-31 DIAGNOSIS — F17.211 CIGARETTE NICOTINE DEPENDENCE IN REMISSION: ICD-10-CM

## 2022-05-31 DIAGNOSIS — G47.33 OSA (OBSTRUCTIVE SLEEP APNEA): ICD-10-CM

## 2022-05-31 PROCEDURE — 93000 ELECTROCARDIOGRAM COMPLETE: CPT | Performed by: SPECIALIST

## 2022-05-31 PROCEDURE — 99214 OFFICE O/P EST MOD 30 MIN: CPT | Performed by: SPECIALIST

## 2022-05-31 RX ORDER — METOPROLOL TARTRATE 50 MG/1
75 TABLET, FILM COATED ORAL 2 TIMES DAILY
Qty: 180 TABLET | Refills: 1 | Status: SHIPPED | OUTPATIENT
Start: 2022-05-31 | End: 2022-05-31 | Stop reason: SDUPTHER

## 2022-05-31 RX ORDER — METOPROLOL TARTRATE 50 MG/1
75 TABLET, FILM COATED ORAL 2 TIMES DAILY
Qty: 270 TABLET | Refills: 1 | Status: SHIPPED | OUTPATIENT
Start: 2022-05-31 | End: 2022-08-09 | Stop reason: SDUPTHER

## 2022-05-31 NOTE — PROGRESS NOTES
Subjective   Follow up, paroxysmal atrial fibrillation, syncope  Antonette Martinez is a 36 y.o. female who presents to day for Med Management (Verbal. ), Results (Monitor results.), Chest Pain, Shortness of Breath, Edema, Palpitations, Dizziness, and Syncope.    CHIEF COMPLIANT  Chief Complaint   Patient presents with   • Med Management     Verbal.    • Results     Monitor results.   • Chest Pain   • Shortness of Breath   • Edema   • Palpitations   • Dizziness   • Syncope       Active Problems:  Problem List Items Addressed This Visit        Cardiac and Vasculature    Palpitations    Relevant Medications    metoprolol tartrate (LOPRESSOR) 50 MG tablet    Pacemaker    Precordial pain    Relevant Orders    Stress Test With Myocardial Perfusion One Day    Essential hypertension    Relevant Medications    metoprolol tartrate (LOPRESSOR) 50 MG tablet    Paroxysmal atrial fibrillation (HCC)    Relevant Medications    metoprolol tartrate (LOPRESSOR) 50 MG tablet    Other Relevant Orders    Stress Test With Myocardial Perfusion One Day    Adult Transthoracic Echo Complete w/ Color, Spectral and Contrast if necessary per protocol       Pulmonary and Pneumonias    Shortness of breath       Sleep    YARITZA (obstructive sleep apnea)       Symptoms and Signs    Fatigue    Syncope and collapse    Relevant Orders    Ambulatory Referral to Cardiac Electrophysiology       Tobacco    Cigarette nicotine dependence in remission      Other Visit Diagnoses     AF (paroxysmal atrial fibrillation) (HCC)    -  Primary    Relevant Medications    metoprolol tartrate (LOPRESSOR) 50 MG tablet    Other Relevant Orders    ECG 12 Lead    Ambulatory Referral to Cardiac Electrophysiology          HPI  HPI  Patient's feels that she is not feeling very well she has several episodes of palpitations associated with some chest discomfort and she passed out up to 6 times, with these  PRIOR MEDS  Current Outpatient Medications on File Prior to Visit   Medication  "Sig Dispense Refill   • apixaban (ELIQUIS) 5 MG tablet tablet Take 1 tablet by mouth 2 (Two) Times a Day. 180 tablet 1   • lisinopril (PRINIVIL,ZESTRIL) 5 MG tablet Take 1 tablet by mouth Daily. 90 tablet 1   • [DISCONTINUED] metoprolol tartrate (LOPRESSOR) 50 MG tablet Take 1 tablet by mouth 2 (Two) Times a Day. 180 tablet 1     No current facility-administered medications on file prior to visit.       ALLERGIES  Ambien [zolpidem tartrate]    HISTORY  Past Medical History:   Diagnosis Date   • Cardiac arrhythmia    • Chronic kidney disease     hx of renal failure x2   • DDD (degenerative disc disease), lumbar    • Fibromyalgia    • Heart murmur    • Hypertension    • Mitral valve prolapse    • Osteoporosis    • Pacemaker        Social History     Socioeconomic History   • Marital status: Unknown   Tobacco Use   • Smoking status: Former Smoker     Packs/day: 0.50     Types: Cigarettes     Quit date: 2020     Years since quittin.8   • Smokeless tobacco: Never Used   Substance and Sexual Activity   • Alcohol use: Never   • Drug use: Never       Family History   Problem Relation Age of Onset   • Hypertension Mother    • Arrhythmia Mother    • Hypertension Father    • Hyperlipidemia Father    • Heart attack Maternal Grandmother    • Heart disease Maternal Grandmother    • Heart disease Maternal Grandfather    • Heart failure Maternal Grandfather    • Hypertension Maternal Grandfather        Review of Systems   Respiratory: Positive for chest tightness. Negative for apnea, cough, choking, shortness of breath, wheezing and stridor.    Cardiovascular: Positive for chest pain and palpitations. Negative for leg swelling.   Neurological: Positive for dizziness and syncope.       Objective     VITALS: /77 (BP Location: Right arm, Patient Position: Sitting, Cuff Size: Adult)   Pulse 74   Temp 97 °F (36.1 °C) (Infrared)   Ht 170.2 cm (67\")   Wt 65.3 kg (144 lb)   SpO2 98%   BMI 22.55 kg/m²     LABS:   Lab " Results (most recent)     None          IMAGING:   No Images in the past 120 days found..    EXAM:  Physical Exam  Vitals reviewed.   Constitutional:       Appearance: She is well-developed.   HENT:      Head: Normocephalic and atraumatic.   Eyes:      Pupils: Pupils are equal, round, and reactive to light.   Neck:      Thyroid: No thyromegaly.      Vascular: No JVD.   Cardiovascular:      Rate and Rhythm: Normal rate and regular rhythm.      Heart sounds: Normal heart sounds. No murmur heard.    No friction rub. No gallop.      Comments: Pacer pocket clean and nontender  Pulmonary:      Effort: Pulmonary effort is normal. No respiratory distress.      Breath sounds: Normal breath sounds. No stridor. No wheezing or rales.   Chest:      Chest wall: No tenderness.   Musculoskeletal:         General: No tenderness or deformity.      Cervical back: Neck supple.   Skin:     General: Skin is warm and dry.   Neurological:      Mental Status: She is alert and oriented to person, place, and time.      Cranial Nerves: No cranial nerve deficit.      Coordination: Coordination normal.         Procedure     ECG 12 Lead    Date/Time: 6/2/2022 12:57 PM  Performed by: Tami Gregory MD  Authorized by: Tami Gregory MD           EKG stasis responses with diffuse nonspecific ST-T changes comparing with the EKG 4/26/2020 Pierre [       Assessment & Plan     Diagnoses and all orders for this visit:    1. AF (paroxysmal atrial fibrillation) (HCC) (Primary)  -     ECG 12 Lead; Future  -     Ambulatory Referral to Cardiac Electrophysiology    2. Paroxysmal atrial fibrillation (HCC)  -     Stress Test With Myocardial Perfusion One Day; Future  -     Adult Transthoracic Echo Complete w/ Color, Spectral and Contrast if necessary per protocol; Future    3. Syncope and collapse  -     Ambulatory Referral to Cardiac Electrophysiology    4. Precordial pain  -     Stress Test With Myocardial Perfusion One Day; Future    5. Palpitations  -      metoprolol tartrate (LOPRESSOR) 50 MG tablet; Take 1.5 tablets by mouth 2 (Two) Times a Day.  Dispense: 180 tablet; Refill: 1    6. Pacemaker    7. Essential hypertension    8. Shortness of breath    9. YARITZA (obstructive sleep apnea)    10. Fatigue, unspecified type    11. Cigarette nicotine dependence in remission    1.  On the monitor she had episodes of atrial fibrillation she was started on Eliquis but my concern about episodes of syncope, the cause of which is not clear so I going to reinterrogate the pacemaker also going to refer her more urgently to the electrophysiology service as she has an appointment in September we will try to get a sooner appointment not going to start he have yet on antiarrhythmic agent pending further work-up but I am going to increase the dose of the metoprolol to 75 mg p.o. twice daily, she was advised not to drive  2.  Because of chest pain I am going to go ahead and proceed with stress testing for assessment of ischemia also get an echocardiogram to assess cardiac function wall motion and valve morphology  3.  Currently blood pressures controlled continue current management  4.  She still not using the CPAP she has an appointment with the pulmonologist on the 22nd of this month to try to get the CPAP  5.  She is still not smoking    Return After stress test.               MEDS ORDERED DURING VISIT:  New Medications Ordered This Visit   Medications   • metoprolol tartrate (LOPRESSOR) 50 MG tablet     Sig: Take 1.5 tablets by mouth 2 (Two) Times a Day.     Dispense:  180 tablet     Refill:  1       As always, Butch Martin MD  I appreciate very much the opportunity to participate in the cardiovascular care of your patients. Please do not hesitate to call me with any questions with regards to Antonette Martinez evaluation and management.         This document has been electronically signed by Tami Gregory MD  May 31, 2022 12:43 EDT

## 2022-05-31 NOTE — TELEPHONE ENCOUNTER
Alessia from [facility] called and wanted clarification on dispense of metoprolol, if wanted 4 month supply.

## 2022-06-29 ENCOUNTER — TELEPHONE (OUTPATIENT)
Dept: CARDIOLOGY | Facility: CLINIC | Age: 37
End: 2022-06-29

## 2022-07-14 ENCOUNTER — HOSPITAL ENCOUNTER (OUTPATIENT)
Dept: CARDIOLOGY | Facility: HOSPITAL | Age: 37
Discharge: HOME | End: 2022-07-14

## 2022-07-14 ENCOUNTER — HOSPITAL ENCOUNTER (OUTPATIENT)
Dept: NUCLEAR MEDICINE | Facility: HOSPITAL | Age: 37
Discharge: HOME | End: 2022-07-14

## 2022-07-14 DIAGNOSIS — I48.0 PAROXYSMAL ATRIAL FIBRILLATION: ICD-10-CM

## 2022-07-14 DIAGNOSIS — R07.2 PRECORDIAL PAIN: ICD-10-CM

## 2022-07-14 LAB
BH CV ECHO MEAS - ACS: 2.2 CM
BH CV ECHO MEAS - AO MAX PG: 7.6 MMHG
BH CV ECHO MEAS - AO MEAN PG: 4 MMHG
BH CV ECHO MEAS - AO ROOT DIAM: 2.9 CM
BH CV ECHO MEAS - AO V2 MAX: 138 CM/SEC
BH CV ECHO MEAS - AO V2 VTI: 29.1 CM
BH CV ECHO MEAS - EDV(CUBED): 102.5 ML
BH CV ECHO MEAS - EDV(MOD-SP4): 65.4 ML
BH CV ECHO MEAS - EF(MOD-SP4): 66.2 %
BH CV ECHO MEAS - ESV(CUBED): 24.9 ML
BH CV ECHO MEAS - ESV(MOD-SP4): 22.1 ML
BH CV ECHO MEAS - FS: 37.6 %
BH CV ECHO MEAS - IVS/LVPW: 1.22 CM
BH CV ECHO MEAS - IVSD: 1.11 CM
BH CV ECHO MEAS - LA DIMENSION: 2.6 CM
BH CV ECHO MEAS - LAT PEAK E' VEL: 15.3 CM/SEC
BH CV ECHO MEAS - LV DIASTOLIC VOL/BSA (35-75): 37.2 CM2
BH CV ECHO MEAS - LV MASS(C)D: 165.8 GRAMS
BH CV ECHO MEAS - LV SYSTOLIC VOL/BSA (12-30): 12.6 CM2
BH CV ECHO MEAS - LVIDD: 4.7 CM
BH CV ECHO MEAS - LVIDS: 2.9 CM
BH CV ECHO MEAS - LVOT AREA: 3.5 CM2
BH CV ECHO MEAS - LVOT DIAM: 2.1 CM
BH CV ECHO MEAS - LVPWD: 0.91 CM
BH CV ECHO MEAS - MED PEAK E' VEL: 11.5 CM/SEC
BH CV ECHO MEAS - MV A MAX VEL: 66.4 CM/SEC
BH CV ECHO MEAS - MV E MAX VEL: 96.8 CM/SEC
BH CV ECHO MEAS - MV E/A: 1.46
BH CV ECHO MEAS - PA ACC TIME: 0.16 SEC
BH CV ECHO MEAS - PA PR(ACCEL): 7.9 MMHG
BH CV ECHO MEAS - RAP SYSTOLE: 10 MMHG
BH CV ECHO MEAS - RVSP: 36.6 MMHG
BH CV ECHO MEAS - SI(MOD-SP4): 24.6 ML/M2
BH CV ECHO MEAS - SV(MOD-SP4): 43.3 ML
BH CV ECHO MEAS - TAPSE (>1.6): 1.91 CM
BH CV ECHO MEAS - TR MAX PG: 26.6 MMHG
BH CV ECHO MEAS - TR MAX VEL: 258 CM/SEC
BH CV ECHO MEASUREMENTS AVERAGE E/E' RATIO: 7.22
LEFT ATRIUM VOLUME INDEX: 17.2 ML/M2
MAXIMAL PREDICTED HEART RATE: 181 BPM
STRESS TARGET HR: 154 BPM

## 2022-07-14 PROCEDURE — 93306 TTE W/DOPPLER COMPLETE: CPT

## 2022-07-14 PROCEDURE — 78452 HT MUSCLE IMAGE SPECT MULT: CPT

## 2022-07-14 PROCEDURE — 93017 CV STRESS TEST TRACING ONLY: CPT

## 2022-07-14 PROCEDURE — A9500 TC99M SESTAMIBI: HCPCS | Performed by: SPECIALIST

## 2022-07-14 PROCEDURE — 93018 CV STRESS TEST I&R ONLY: CPT | Performed by: INTERNAL MEDICINE

## 2022-07-14 PROCEDURE — 0 TECHNETIUM SESTAMIBI: Performed by: SPECIALIST

## 2022-07-14 PROCEDURE — 25010000002 REGADENOSON 0.4 MG/5ML SOLUTION: Performed by: SPECIALIST

## 2022-07-14 PROCEDURE — 78452 HT MUSCLE IMAGE SPECT MULT: CPT | Performed by: INTERNAL MEDICINE

## 2022-07-14 PROCEDURE — 93306 TTE W/DOPPLER COMPLETE: CPT | Performed by: SPECIALIST

## 2022-07-14 RX ADMIN — REGADENOSON 0.4 MG: 0.08 INJECTION, SOLUTION INTRAVENOUS at 11:31

## 2022-07-14 RX ADMIN — TECHNETIUM TC 99M SESTAMIBI 1 DOSE: 1 INJECTION INTRAVENOUS at 09:15

## 2022-07-14 RX ADMIN — TECHNETIUM TC 99M SESTAMIBI 1 DOSE: 1 INJECTION INTRAVENOUS at 11:33

## 2022-07-21 LAB
BH CV NUCLEAR PRIOR STUDY: 3
BH CV REST NUCLEAR ISOTOPE DOSE: 9.9 MCI
BH CV STRESS BP STAGE 1: NORMAL
BH CV STRESS COMMENTS STAGE 1: NORMAL
BH CV STRESS DOSE REGADENOSON STAGE 1: 0.4
BH CV STRESS DURATION MIN STAGE 1: 0
BH CV STRESS DURATION SEC STAGE 1: 10
BH CV STRESS HR STAGE 1: 79
BH CV STRESS NUCLEAR ISOTOPE DOSE: 28.9 MCI
BH CV STRESS PROTOCOL 1: NORMAL
BH CV STRESS RECOVERY BP: NORMAL MMHG
BH CV STRESS RECOVERY HR: 76 BPM
BH CV STRESS STAGE 1: 1
LV EF NUC BP: 64 %
MAXIMAL PREDICTED HEART RATE: 181 BPM
PERCENT MAX PREDICTED HR: 43.65 %
STRESS BASELINE BP: NORMAL MMHG
STRESS BASELINE HR: 75 BPM
STRESS PERCENT HR: 51 %
STRESS POST PEAK BP: NORMAL MMHG
STRESS POST PEAK HR: 79 BPM
STRESS TARGET HR: 154 BPM

## 2022-08-09 ENCOUNTER — OFFICE VISIT (OUTPATIENT)
Dept: CARDIOLOGY | Facility: CLINIC | Age: 37
End: 2022-08-09

## 2022-08-09 VITALS
HEIGHT: 67 IN | HEART RATE: 77 BPM | BODY MASS INDEX: 23.54 KG/M2 | SYSTOLIC BLOOD PRESSURE: 132 MMHG | DIASTOLIC BLOOD PRESSURE: 88 MMHG | OXYGEN SATURATION: 100 % | WEIGHT: 150 LBS

## 2022-08-09 DIAGNOSIS — F17.211 CIGARETTE NICOTINE DEPENDENCE IN REMISSION: ICD-10-CM

## 2022-08-09 DIAGNOSIS — I48.0 PAROXYSMAL ATRIAL FIBRILLATION: ICD-10-CM

## 2022-08-09 DIAGNOSIS — R07.2 PRECORDIAL PAIN: ICD-10-CM

## 2022-08-09 DIAGNOSIS — R00.2 PALPITATIONS: ICD-10-CM

## 2022-08-09 DIAGNOSIS — G47.33 OSA (OBSTRUCTIVE SLEEP APNEA): ICD-10-CM

## 2022-08-09 DIAGNOSIS — R55 SYNCOPE AND COLLAPSE: Primary | ICD-10-CM

## 2022-08-09 DIAGNOSIS — I10 ESSENTIAL HYPERTENSION: ICD-10-CM

## 2022-08-09 DIAGNOSIS — R06.02 SHORTNESS OF BREATH: ICD-10-CM

## 2022-08-09 DIAGNOSIS — Z95.0 PACEMAKER: ICD-10-CM

## 2022-08-09 PROCEDURE — 99214 OFFICE O/P EST MOD 30 MIN: CPT | Performed by: SPECIALIST

## 2022-08-09 RX ORDER — METOPROLOL TARTRATE 50 MG/1
75 TABLET, FILM COATED ORAL 2 TIMES DAILY
Qty: 270 TABLET | Refills: 1 | Status: SHIPPED | OUTPATIENT
Start: 2022-08-09

## 2022-08-09 RX ORDER — LISINOPRIL 5 MG/1
5 TABLET ORAL DAILY
Qty: 90 TABLET | Refills: 1 | Status: SHIPPED | OUTPATIENT
Start: 2022-08-09

## 2022-08-09 NOTE — PROGRESS NOTES
Subjective   Follow up, paroxysmal atrial fibrillation, pacemaker  Antonette Martinez is a 37 y.o. female who presents to day for Med Management (Verbal. ), Results (Stress and echo. ), Chest Pain, Shortness of Breath, Palpitations, Dizziness, and Edema.    CHIEF COMPLIANT  Chief Complaint   Patient presents with   • Med Management     Verbal.    • Results     Stress and echo.    • Chest Pain   • Shortness of Breath   • Palpitations   • Dizziness   • Edema       Active Problems:  Problem List Items Addressed This Visit        Cardiac and Vasculature    Palpitations    Relevant Medications    metoprolol tartrate (LOPRESSOR) 50 MG tablet    Pacemaker    Precordial pain    Essential hypertension    Relevant Medications    lisinopril (PRINIVIL,ZESTRIL) 5 MG tablet    metoprolol tartrate (LOPRESSOR) 50 MG tablet    Other Relevant Orders    Lipid Panel    Comprehensive Metabolic Panel    Paroxysmal atrial fibrillation (HCC)    Relevant Medications    apixaban (ELIQUIS) 5 MG tablet tablet    metoprolol tartrate (LOPRESSOR) 50 MG tablet    Other Relevant Orders    CBC (No Diff)       Pulmonary and Pneumonias    Shortness of breath       Sleep    YARITZA (obstructive sleep apnea)       Symptoms and Signs    Syncope and collapse - Primary       Tobacco    Cigarette nicotine dependence in remission          HPI  HPI  She is feeling little bit better but she still getting palpitations and skipping of the heartbeat almost daily with intermittent lower extremity edema stable shortness of breath she has a lot of allergies environmentally no further syncope but occasional dizzy spells still has not received the CPAP yet  PRIOR MEDS  Current Outpatient Medications on File Prior to Visit   Medication Sig Dispense Refill   • [DISCONTINUED] apixaban (ELIQUIS) 5 MG tablet tablet Take 1 tablet by mouth 2 (Two) Times a Day. 180 tablet 1   • [DISCONTINUED] lisinopril (PRINIVIL,ZESTRIL) 5 MG tablet Take 1 tablet by mouth Daily. 90 tablet 1   •  "[DISCONTINUED] metoprolol tartrate (LOPRESSOR) 50 MG tablet Take 1.5 tablets by mouth 2 (Two) Times a Day. 270 tablet 1     No current facility-administered medications on file prior to visit.       ALLERGIES  Ambien [zolpidem tartrate]    HISTORY  Past Medical History:   Diagnosis Date   • Cardiac arrhythmia    • Chronic kidney disease     hx of renal failure x2   • DDD (degenerative disc disease), lumbar    • Fibromyalgia    • Heart murmur    • Hypertension    • Mitral valve prolapse    • Osteoporosis    • Pacemaker        Social History     Socioeconomic History   • Marital status: Other   Tobacco Use   • Smoking status: Former Smoker     Packs/day: 0.50     Types: Cigarettes     Quit date: 2020     Years since quittin.0   • Smokeless tobacco: Never Used   Substance and Sexual Activity   • Alcohol use: Never   • Drug use: Never       Family History   Problem Relation Age of Onset   • Hypertension Mother    • Arrhythmia Mother    • Hypertension Father    • Hyperlipidemia Father    • Heart attack Maternal Grandmother    • Heart disease Maternal Grandmother    • Heart disease Maternal Grandfather    • Heart failure Maternal Grandfather    • Hypertension Maternal Grandfather        Review of Systems   Respiratory: Positive for shortness of breath. Negative for apnea, cough, choking, chest tightness, wheezing and stridor.    Cardiovascular: Positive for palpitations and leg swelling. Negative for chest pain.   Neurological: Positive for dizziness. Negative for syncope.       Objective     VITALS: /88 (BP Location: Left arm, Patient Position: Sitting, Cuff Size: Adult)   Pulse 77   Ht 170.2 cm (67\")   Wt 68 kg (150 lb)   SpO2 100%   BMI 23.49 kg/m²     LABS:   Lab Results (most recent)     None          IMAGING:   No Images in the past 120 days found..    EXAM:  Physical Exam  Vitals reviewed.   Constitutional:       Appearance: She is well-developed.   HENT:      Head: Normocephalic and " atraumatic.   Eyes:      Pupils: Pupils are equal, round, and reactive to light.   Neck:      Thyroid: No thyromegaly.      Vascular: No JVD.   Cardiovascular:      Rate and Rhythm: Normal rate and regular rhythm.      Heart sounds: Normal heart sounds. No murmur heard.    No friction rub. No gallop.      Comments: Pacer pocket clean and nontender  Pulmonary:      Effort: Pulmonary effort is normal. No respiratory distress.      Breath sounds: Normal breath sounds. No stridor. No wheezing or rales.   Chest:      Chest wall: No tenderness.   Musculoskeletal:         General: No tenderness or deformity.      Cervical back: Neck supple.   Skin:     General: Skin is warm and dry.   Neurological:      Mental Status: She is alert and oriented to person, place, and time.      Cranial Nerves: No cranial nerve deficit.      Coordination: Coordination normal.         Procedure   Procedures       Assessment & Plan     Diagnoses and all orders for this visit:    1. Syncope and collapse (Primary)    2. Palpitations  -     metoprolol tartrate (LOPRESSOR) 50 MG tablet; Take 1.5 tablets by mouth 2 (Two) Times a Day.  Dispense: 270 tablet; Refill: 1    3. Pacemaker    4. Essential hypertension  -     lisinopril (PRINIVIL,ZESTRIL) 5 MG tablet; Take 1 tablet by mouth Daily.  Dispense: 90 tablet; Refill: 1  -     Lipid Panel; Future  -     Comprehensive Metabolic Panel; Future    5. Precordial pain    6. Paroxysmal atrial fibrillation (HCC)  -     apixaban (ELIQUIS) 5 MG tablet tablet; Take 1 tablet by mouth 2 (Two) Times a Day.  Dispense: 180 tablet; Refill: 1  -     CBC (No Diff); Future    7. Shortness of breath    8. YARITZA (obstructive sleep apnea)    9. Cigarette nicotine dependence in remission    1.  We discussed the results of the stress test which was negative for ischemia  2.  Echocardiogram with normal systolic function otherwise with mild elevation in PA pressure advised unremarkable  3.  Unfortunately pacemaker was not  interrogated since January we will try to interrogate the pacemaker  4.  Regarding atrial fibrillation currently she is in sinus mechanism she has an appointment with the EP service on the 19th of this month.  Hopefully will treat the pacemaker also by then  5.  Unfortunately she still not using the CPAP has not received the new machine I strongly recommend that she is to try to get this as soon as possible  6.  Continue anticoagulation for thromboembolic prophylaxis  7.  Again she is still not smoking  8.  Her diastolic blood pressure is slightly elevated but for now we will continue current medications and will monitor  Return in about 3 months (around 11/9/2022).                 MEDS ORDERED DURING VISIT:  New Medications Ordered This Visit   Medications   • apixaban (ELIQUIS) 5 MG tablet tablet     Sig: Take 1 tablet by mouth 2 (Two) Times a Day.     Dispense:  180 tablet     Refill:  1   • lisinopril (PRINIVIL,ZESTRIL) 5 MG tablet     Sig: Take 1 tablet by mouth Daily.     Dispense:  90 tablet     Refill:  1   • metoprolol tartrate (LOPRESSOR) 50 MG tablet     Sig: Take 1.5 tablets by mouth 2 (Two) Times a Day.     Dispense:  270 tablet     Refill:  1       As always, Butch Martin MD  I appreciate very much the opportunity to participate in the cardiovascular care of your patients. Please do not hesitate to call me with any questions with regards to Antonette Martinez evaluation and management.         This document has been electronically signed by Tami Gregory MD  August 9, 2022 10:35 EDT

## 2022-08-17 NOTE — PROGRESS NOTES
Cardiac Electrophysiology Outpatient Note  [facility]    Office Visit     Antonette Martinez  [id number]  08/19/2022    Primary Care Physician: Butch Martin MD    Referred By: Tami Gregory MD    CC: PAF    Problem List:  1. Paroxysmal atrial fibrillation  a. [id number]= 1 (female), on eliquis for stroke prevention  b. Event monitor: predominately Afib, avg rate 83 bpm, min rate 69 bpm, max 109 bpm  c. Echo 07/14/2022: EF 56-60%, RVSP 35-45 mmhg  d. Stress test 07/1212022: No evidence of ischemia  2. [employer] dual chamber PPM, implanted 04/2019  3. Syncope  4. MVP  5. Hypertension  6. YARITZA, pending CPAP  7. CKD  8. DDD  9. Fibromyalgia  10. Osteoporosis    History of Present Illness:   Antonette Martinez is a 37 y.o. female who presents to the electrophysiology clinic for consultation regarding paroxysmal atrial fibrillation, requested by Dr. Gregory.    She has a long cardiac history dating back into around 2010.  She was found to have inappropriate sinus tachycardia and underwent multiple ablations for this with Dr. Shoen (during 1 of these atrial flutter was ablated as well)..  She continued to have recurrent symptoms after each of these ablations, and eventually then underwent a open surgical cryoablation for this.  This was complicated by severe sinus node dysfunction after when she underwent permanent pacemaker implantation.  She has been following with Dr. Gregory since then.  She continues to have fairly frequent palpitations and has undergone ambulatory monitoring.  Recently she was found to have possible atrial fibrillation and was referred here for further evaluation of this.    She described sensation of feeling palpitations from time to time as well as feeling dizziness.  She also feels a head rush.  She denies any chest pain, dyspnea, orthopnea, PND, lower extremity swelling.  She denies problems at her pacemaker site.    Past Surgical History:   Procedure  "Laterality Date   • APPENDECTOMY     • CARDIAC ABLATION      X6   • OVARIAN CYST REMOVAL     • PACEMAKER IMPLANTATION  2011   • TONSILLECTOMY AND ADENOIDECTOMY         Family History   Problem Relation Age of Onset   • Hypertension Mother    • Arrhythmia Mother    • Hypertension Father    • Hyperlipidemia Father    • No Known Problems Sister    • Heart attack Maternal Grandmother    • Heart disease Maternal Grandmother    • Heart disease Maternal Grandfather    • Heart failure Maternal Grandfather    • Hypertension Maternal Grandfather        Social History     Socioeconomic History   • Marital status: Other   Tobacco Use   • Smoking status: Former Smoker     Packs/day: 0.50     Types: Cigarettes     Quit date: 2020     Years since quittin.0   • Smokeless tobacco: Never Used   Vaping Use   • Vaping Use: Never used   Substance and Sexual Activity   • Alcohol use: Never   • Drug use: Never   • Sexual activity: Defer         Current Outpatient Medications:   •  apixaban (ELIQUIS) 5 MG tablet tablet, Take 1 tablet by mouth 2 (Two) Times a Day., Disp: 180 tablet, Rfl: 1  •  lisinopril (PRINIVIL,ZESTRIL) 5 MG tablet, Take 1 tablet by mouth Daily., Disp: 90 tablet, Rfl: 1  •  metoprolol tartrate (LOPRESSOR) 50 MG tablet, Take 1.5 tablets by mouth 2 (Two) Times a Day., Disp: 270 tablet, Rfl: 1    Allergies:   Allergies   Allergen Reactions   • Ambien [Zolpidem Tartrate] Hallucinations       Vital Signs: Blood pressure 136/88, pulse 93, height 170.2 cm (67\"), weight 66.1 kg (145 lb 12.8 oz), SpO2 99 %.    GEN: Well nourished, well-developed, no acute distress  HEENT: Normocephalic, atraumatic, moist mucous membranes  NECK: Supple, no JVD, no thyromegaly, no lymphadenopathy  CARD: Regular rate and rhythm, normal S1 & S2 are present.  No murmur, gallop or rubs are appreciated.  LUNGS: Clear to auscultation bilateraly, normal respiratory effort  ABDOMEN: Soft, nontender, normal bowel sounds  EXTREMITIES: No gross " deformities, no clubbing, cyanosis. No edema  SKIN: Warm, dry  NEURO: No focal deficits, alert and oriented x 3  PSYCHIATRIC: Normal affect and mood, appropriate use of semantics and logic.      No results found for: GLUCOSE, CALCIUM, NA, K, CO2, CL, BUN, CREATININE, EGFRIFAFRI, EGFRIFNONA, BCR, ANIONGAP  No results found for: WBC, HGB, HCT, MCV, PLT  No results found for: INR, PROTIME  No results found for: TSH, U2QYCQN, A5FOJZZ, THYROIDAB     Results for orders placed during the hospital encounter of 07/14/22    Adult Transthoracic Echo Complete w/ Color, Spectral and Contrast if necessary per protocol    Interpretation Summary  · Left ventricular ejection fraction appears to be 56 - 60%. Left ventricular systolic function is normal.  · Left ventricular diastolic function was normal.  · Estimated right ventricular systolic pressure from tricuspid regurgitation is mildly elevated (35-45 mmHg).  · Pacemaker leads seen at the right atrium and right ventricle.      I personally viewed and interpreted the patient's EKG/Telemetry/lab data.      ECG 12 Lead    Date/Time: 8/19/2022 1:56 PM  Performed by: Gucci Méndez MD  Authorized by: Gucci Méndez MD               Antonette Martinez  reports that she quit smoking about 2 years ago. Her smoking use included cigarettes. She smoked 0.50 packs per day. She has never used smokeless tobacco..   Assessment & Plan    1. Paroxysmal atrial fibrillation (HCC)  She was referred for a history of possible atrial fibrillation.  She did have an atrial flutter ablation during one of her prior procedures.  I reviewed her ambulatory monitor and her pacemaker.  There are no episodes on her pacemaker that are consistent with atrial fibrillation.  She did have several short episodes of what appear to be an atrial tachycardia.  None of these lasted longer than approximately 70 seconds.  I reviewed her ambulatory monitor as well.  She did not have any clear atrial fibrillation on that.   Tracings were quite difficult to interpret however.  I discussed this with her and we elected to proceed with a longer ambulatory monitor to better characterize this.  We will continue her current medicines for now.    2. Inappropriate sinus tachycardia   She was found to have inappropriate sinus tachycardia and underwent multiple ablations for this with Dr. Shoen (during 1 of these atrial flutter was ablated as well)..  She continued to have recurrent symptoms after each of these ablations, and eventually then underwent a open surgical cryoablation for this.  This was complicated by severe sinus node dysfunction after when she underwent permanent pacemaker implantation.  Currently atrially paced about 90% of the time    3. Pacemaker  Her [employer] single-chamber AAIR pacemaker was interrogated.  She is approximately 5 and half years of battery life remaining.  She is paced 90% of the time the atrium.  Her P waves are relatively low at 1.6 mV.  Her threshold today was 1.2 V at 0.4 ms.  I increased her output to 2.4 V to maintain a 2 times safety margin.       Follow Up:  Return in about 6 months (around 2/19/2023).

## 2022-08-19 ENCOUNTER — OFFICE VISIT (OUTPATIENT)
Dept: CARDIOLOGY | Facility: CLINIC | Age: 37
End: 2022-08-19

## 2022-08-19 VITALS
WEIGHT: 145.8 LBS | BODY MASS INDEX: 22.88 KG/M2 | DIASTOLIC BLOOD PRESSURE: 88 MMHG | SYSTOLIC BLOOD PRESSURE: 136 MMHG | OXYGEN SATURATION: 99 % | HEART RATE: 93 BPM | HEIGHT: 67 IN

## 2022-08-19 DIAGNOSIS — Z95.0 PACEMAKER: ICD-10-CM

## 2022-08-19 DIAGNOSIS — R00.0 INAPPROPRIATE SINUS TACHYCARDIA: ICD-10-CM

## 2022-08-19 DIAGNOSIS — R00.2 PALPITATIONS: ICD-10-CM

## 2022-08-19 DIAGNOSIS — I48.0 PAROXYSMAL ATRIAL FIBRILLATION: Primary | ICD-10-CM

## 2022-08-19 PROCEDURE — 99214 OFFICE O/P EST MOD 30 MIN: CPT | Performed by: STUDENT IN AN ORGANIZED HEALTH CARE EDUCATION/TRAINING PROGRAM

## 2022-08-19 PROCEDURE — 93000 ELECTROCARDIOGRAM COMPLETE: CPT | Performed by: STUDENT IN AN ORGANIZED HEALTH CARE EDUCATION/TRAINING PROGRAM

## 2022-08-19 PROCEDURE — 93279 PRGRMG DEV EVAL PM/LDLS PM: CPT | Performed by: STUDENT IN AN ORGANIZED HEALTH CARE EDUCATION/TRAINING PROGRAM

## 2022-08-22 DIAGNOSIS — I48.0 PAROXYSMAL ATRIAL FIBRILLATION: ICD-10-CM

## 2022-08-22 DIAGNOSIS — R00.2 PALPITATIONS: Primary | ICD-10-CM

## 2022-09-30 ENCOUNTER — CLINICAL SUPPORT NO REQUIREMENTS (OUTPATIENT)
Dept: CARDIOLOGY | Facility: CLINIC | Age: 37
End: 2022-09-30

## 2022-09-30 DIAGNOSIS — I49.5 SSS (SICK SINUS SYNDROME): Primary | ICD-10-CM

## 2022-09-30 PROCEDURE — 93288 INTERROG EVL PM/LDLS PM IP: CPT | Performed by: INTERNAL MEDICINE

## 2022-10-24 ENCOUNTER — TELEPHONE (OUTPATIENT)
Dept: CARDIOLOGY | Facility: CLINIC | Age: 37
End: 2022-10-24

## 2022-10-24 NOTE — TELEPHONE ENCOUNTER
Gucci Méndez MD Moreno-Gaft, Abbey, RN  I sent her the following my chart message which she did not read     I reviewed your monitor.  There were a couple very short episodes of fast heartbeats and only lasted for a second or 2.  These do not seem to be associated with any symptoms.  Overall your average heart rate was within normal limits.  Let me know of any questions

## 2022-11-09 ENCOUNTER — TELEPHONE (OUTPATIENT)
Dept: CARDIOLOGY | Facility: CLINIC | Age: 37
End: 2022-11-09

## 2022-11-11 ENCOUNTER — TELEPHONE (OUTPATIENT)
Dept: CARDIOLOGY | Facility: CLINIC | Age: 37
End: 2022-11-11

## 2022-12-28 PROCEDURE — 93294 REM INTERROG EVL PM/LDLS PM: CPT | Performed by: SPECIALIST

## 2022-12-28 PROCEDURE — 93296 REM INTERROG EVL PM/IDS: CPT | Performed by: SPECIALIST

## 2023-01-30 ENCOUNTER — TELEPHONE (OUTPATIENT)
Dept: CARDIOLOGY | Facility: CLINIC | Age: 38
End: 2023-01-30
Payer: COMMERCIAL

## 2023-02-16 ENCOUNTER — OFFICE VISIT (OUTPATIENT)
Dept: CARDIOLOGY | Facility: CLINIC | Age: 38
End: 2023-02-16
Payer: COMMERCIAL

## 2023-02-16 VITALS
DIASTOLIC BLOOD PRESSURE: 93 MMHG | SYSTOLIC BLOOD PRESSURE: 134 MMHG | WEIGHT: 136.2 LBS | BODY MASS INDEX: 21.38 KG/M2 | OXYGEN SATURATION: 100 % | RESPIRATION RATE: 16 BRPM | HEART RATE: 75 BPM | HEIGHT: 67 IN

## 2023-02-16 DIAGNOSIS — Z95.0 PACEMAKER: ICD-10-CM

## 2023-02-16 DIAGNOSIS — I48.0 PAROXYSMAL ATRIAL FIBRILLATION: ICD-10-CM

## 2023-02-16 DIAGNOSIS — R55 SYNCOPE AND COLLAPSE: Primary | ICD-10-CM

## 2023-02-16 DIAGNOSIS — R00.1 BRADYCARDIA: ICD-10-CM

## 2023-02-16 DIAGNOSIS — I10 ESSENTIAL HYPERTENSION: ICD-10-CM

## 2023-02-16 DIAGNOSIS — R00.2 PALPITATIONS: ICD-10-CM

## 2023-02-16 PROCEDURE — 99214 OFFICE O/P EST MOD 30 MIN: CPT | Performed by: NURSE PRACTITIONER

## 2023-02-16 RX ORDER — GABAPENTIN 300 MG/1
300 CAPSULE ORAL 2 TIMES DAILY
COMMUNITY

## 2023-02-16 NOTE — PROGRESS NOTES
"                [facility]             [facility] ALEXANDRIA Lockwood Jared K, MD  Antonette Martinez  1985  02/16/2023    Patient Active Problem List   Diagnosis   • Palpitations   • Pacemaker   • Precordial pain   • Shortness of breath   • Essential hypertension   • YARITZA (obstructive sleep apnea)   • Fatigue   • Cigarette nicotine dependence in remission   • Paroxysmal atrial fibrillation (HCC)   • Syncope and collapse       Dear Butch Martin MD:    Subjective     Chief Complaint   Patient presents with   • Palpitations     Races,slows,flutters   • Chest Pain     \"Discomfort\" with palp   • Shortness of Breath     Routine activity   • Fatigue     excess   • Med Management     verbal   • Dizziness     With stated 3 syncopal epis     HPI:     This is a 37 y.o. female with known past medical history of paroxysmal atrial fibrillation/flutter status post multiple ablations with open surgical cryoablation, single-chamber permanent pacemaker implantation 4/2019 due to severe sinus node dysfunction, essential hypertension.     Antonette Martinez presents today for routine cardiology follow up.  Patient states she has had 3 syncopal episodes since 1 January.  Prior to the syncopal episode she states her heart races and flutters and then she passes out.  She did have an incident of losing bladder control on 1 syncopal episode.  She does wake confused after the episode.  She also reports intermittent episodes of bradycardia where her heart rate has been in the 30s and 40s.  Denies any chest pain.  Reports persistent fatigue.  She states she is scheduled to see neurology at the end of March due to some issues with her back, pupils and left-sided pain in her arm and leg.  Blood pressure stable in the office today.  Reports recent labs from her PCP which are not available for review today.  Remote pacemaker monitoring from October 2022 showed battery life of 5 years with normal function.   "   • Diagnostic Testing  1. Cardiac event monitor 7/2020: Normal sinus rhythm  2. Cardiac event monitor 4/2021: Normal sinus rhythm with rare PVCs with episodes of atrial fibrillation  3. Echocardiogram 5/2022: EF 56 to 60%  4. Nuclear stress test 5/2022: No evidence of ischemia  5. Holter monitor 8/2022: Short nonsustained VT/atrial tachycardia with normal average heart rate     All other systems were reviewed and were negative.    Patient Active Problem List   Diagnosis   • Palpitations   • Pacemaker   • Precordial pain   • Shortness of breath   • Essential hypertension   • YARITZA (obstructive sleep apnea)   • Fatigue   • Cigarette nicotine dependence in remission   • Paroxysmal atrial fibrillation (HCC)   • Syncope and collapse       family history includes Arrhythmia in her mother; Heart attack in her maternal grandmother; Heart disease in her maternal grandfather and maternal grandmother; Heart failure in her maternal grandfather; Hyperlipidemia in her father; Hypertension in her father, maternal grandfather, and mother; No Known Problems in her sister.     reports that she quit smoking about 2 years ago. Her smoking use included cigarettes. She smoked an average of .5 packs per day. She has never used smokeless tobacco. She reports that she does not drink alcohol and does not use drugs.    Allergies   Allergen Reactions   • Ambien [Zolpidem Tartrate] Hallucinations         Current Outpatient Medications:   •  apixaban (ELIQUIS) 5 MG tablet tablet, Take 1 tablet by mouth 2 (Two) Times a Day., Disp: 180 tablet, Rfl: 1  •  lisinopril (PRINIVIL,ZESTRIL) 5 MG tablet, Take 1 tablet by mouth Daily., Disp: 90 tablet, Rfl: 1  •  metoprolol tartrate (LOPRESSOR) 50 MG tablet, Take 1.5 tablets by mouth 2 (Two) Times a Day., Disp: 270 tablet, Rfl: 1  •  gabapentin (NEURONTIN) 300 MG capsule, Take 300 mg by mouth 2 (Two) Times a Day., Disp: , Rfl:       Physical Exam:  I have reviewed the patient's current vital signs as  documented in the patient's EMR.   Vitals:    02/16/23 1052   BP: 134/93   Pulse: 75   Resp: 16   SpO2: 100%     Body mass index is 21.33 kg/m².       02/16/23  1052   Weight: 61.8 kg (136 lb 3.2 oz)      Physical Exam  Constitutional:       General: She is not in acute distress.     Appearance: Normal appearance. She is well-developed and normal weight.   HENT:      Head: Normocephalic and atraumatic.   Eyes:      General: Lids are normal.      Conjunctiva/sclera: Conjunctivae normal.      Pupils: Pupils are equal, round, and reactive to light.   Neck:      Vascular: No carotid bruit or JVD.   Cardiovascular:      Rate and Rhythm: Normal rate and regular rhythm.      Pulses: Normal pulses.      Heart sounds: Normal heart sounds, S1 normal and S2 normal. No murmur heard.  Pulmonary:      Effort: Pulmonary effort is normal. No respiratory distress.      Breath sounds: Normal breath sounds. No wheezing.   Abdominal:      General: Bowel sounds are normal. There is no distension.      Palpations: Abdomen is soft. There is no hepatomegaly or splenomegaly.      Tenderness: There is no abdominal tenderness.   Musculoskeletal:         General: No swelling. Normal range of motion.      Cervical back: Normal range of motion and neck supple.      Right lower leg: No edema.      Left lower leg: No edema.   Skin:     General: Skin is warm and dry.      Coloration: Skin is not jaundiced.      Findings: No rash.   Neurological:      General: No focal deficit present.      Mental Status: She is alert and oriented to person, place, and time. Mental status is at baseline.   Psychiatric:         Mood and Affect: Mood normal.         Speech: Speech normal.         Behavior: Behavior normal.         Thought Content: Thought content normal.         Judgment: Judgment normal.            DATA REVIEWED:     TTE/KOLTON:  Results for orders placed during the hospital encounter of 07/14/22    Adult Transthoracic Echo Complete w/ Color, Spectral  and Contrast if necessary per protocol    Interpretation Summary  · Left ventricular ejection fraction appears to be 56 - 60%. Left ventricular systolic function is normal.  · Left ventricular diastolic function was normal.  · Estimated right ventricular systolic pressure from tricuspid regurgitation is mildly elevated (35-45 mmHg).  · Pacemaker leads seen at the right atrium and right ventricle.      Laboratory evaluations:    No results found for: GLUCOSE, BUN, CREATININE, EGFRIFNONA, EGFRIFAFRI, BCR, K, CO2, CALCIUM, PROTENTOTREF, ALBUMIN, LABIL2, BILIRUBIN, AST, ALT  No results found for: WBC, HGB, HCT, MCV, PLT  No results found for: CHOL, CHLPL, TRIG, HDL, LDL, LDLDIRECT  No results found for: TSH, F6HIGHR, B5NYNXO, THYROIDAB  No results found for: HGBA1C  No results found for: ALT  No results found for: HGBA1C  No results found for: GLUF, MICROALBUR, CREATININE  No results found for: IRON, TIBC, FERRITIN  No results found for: INR, PROTIME        --------------------------------------------------------------------------------------------------------------------------    ASSESSMENT/PLAN:      Diagnosis Plan   1. Syncope and collapse  Ambulatory Referral to Cardiac Electrophysiology      2. Paroxysmal atrial fibrillation (HCC)        3. Pacemaker        4. Palpitations        5. Essential hypertension        6. Bradycardia  Ambulatory Referral to Cardiac Electrophysiology          1. Syncope  2. Bradycardia  3. Permanent pacemaker implantation  4. Palpitations  • Patient with reported syncope x3 days since January with intermittent bradycardia in the 30s and 40s.  Will Schedule her for pacemaker interrogation.  Did discuss with her about possible monitor however she states she is unable to pay for this as she has had monitors before and insurance told her they will no longer approve due to her owing money for her last monitor.  Discussed case with Dr. Gregory who recommends referral and follow-up with EP for  discussion of possible upgrade to dual-chamber pacemaker given her syncope and bradycardia.   • Recent labs requested from PCP to review.    This document has been @Electronically signed by ALEXANDRIA Colunga, 02/16/23, 10:42 AM EST.       Dictated Utilizing Dragon Dictation: Part of this note may be an electronic transcription/translation of spoken language to printed text using the Dragon Dictation System.    Follow-up appointment and medication changes provided in hand delivered After Visit Summary as well as reviewed in the room.

## 2023-02-17 ENCOUNTER — TELEPHONE (OUTPATIENT)
Dept: CARDIOLOGY | Facility: CLINIC | Age: 38
End: 2023-02-17
Payer: COMMERCIAL

## 2023-02-17 NOTE — TELEPHONE ENCOUNTER
----- Message from ALEXANDRIA Monroy sent at 2/16/2023 12:42 PM EST -----  Please call patient and let her know I discussed her case with Dr. Gregory.  He recommends pacemaker interrogation which is already scheduled and also we will refer you to Dr. Méndez with EP to discuss further about possible dual-chamber pacemaker if he feels this is needed.    Spoke to patient she states already has appt with Dr. Pappas in April and will keep pacer interr appt 03/2023.

## 2023-02-17 NOTE — TELEPHONE ENCOUNTER
Caller: Antonette Martinez    Relationship: Self    Best call back number: [phone]    What is the medical concern/diagnosis: CHECK FOR SEIZURES     What specialty or service is being requested: NEUROLGIST     Any additional details: PATIENT WAS TOLD YESTERDAY AT APPOINTMENT THAT SHE WOULD BE REFERRED TO A SPECIALIST TO CHECK FOR SEIZURES. STATED THAT SHE HAS AN APPOINTMENT WITH A NEUROSURGEON AT  BUT WOULD LIKE TO SEE NEUROLOGY. WOULD LIKE TO HAVE A OFFICE NEAR [facility] DUE TO PATIENT LIVING 2 HOURS FROM [place].

## 2023-02-20 DIAGNOSIS — R55 SYNCOPE AND COLLAPSE: Primary | ICD-10-CM

## 2023-03-29 PROCEDURE — 93294 REM INTERROG EVL PM/LDLS PM: CPT | Performed by: SPECIALIST

## 2023-03-29 PROCEDURE — 93296 REM INTERROG EVL PM/IDS: CPT | Performed by: SPECIALIST

## 2023-05-13 NOTE — TELEPHONE ENCOUNTER
----- Message from ALEXANDRIA Monroy sent at 2/16/2023 11:17 AM EST -----  Please request labs from PCP    Requested   patient

## 2023-07-18 PROBLEM — I49.5 SINUS NODE DYSFUNCTION: Status: ACTIVE | Noted: 2023-07-18

## 2023-09-27 PROCEDURE — 93296 REM INTERROG EVL PM/IDS: CPT | Performed by: SPECIALIST

## 2023-09-27 PROCEDURE — 93294 REM INTERROG EVL PM/LDLS PM: CPT | Performed by: SPECIALIST

## 2023-10-02 ENCOUNTER — HOSPITAL ENCOUNTER (OUTPATIENT)
Dept: CT IMAGING | Facility: HOSPITAL | Age: 38
Discharge: HOME | End: 2023-10-02
Payer: COMMERCIAL

## 2023-10-02 ENCOUNTER — TELEPHONE (OUTPATIENT)
Dept: NEUROLOGY | Facility: CLINIC | Age: 38
End: 2023-10-02
Payer: COMMERCIAL

## 2023-10-02 ENCOUNTER — HOSPITAL ENCOUNTER (OUTPATIENT)
Dept: NEUROLOGY | Facility: HOSPITAL | Age: 38
Discharge: HOME | End: 2023-10-02
Payer: COMMERCIAL

## 2023-10-02 DIAGNOSIS — G62.9 NEUROPATHY: ICD-10-CM

## 2023-10-02 DIAGNOSIS — M62.81 MUSCLE WEAKNESS (GENERALIZED): ICD-10-CM

## 2023-10-02 DIAGNOSIS — R55 SYNCOPE AND COLLAPSE: ICD-10-CM

## 2023-10-02 PROCEDURE — 95819 EEG AWAKE AND ASLEEP: CPT

## 2023-10-02 PROCEDURE — 95819 EEG AWAKE AND ASLEEP: CPT | Performed by: PSYCHIATRY & NEUROLOGY

## 2023-11-15 ENCOUNTER — CLINICAL SUPPORT NO REQUIREMENTS (OUTPATIENT)
Dept: CARDIOLOGY | Facility: CLINIC | Age: 38
End: 2023-11-15
Payer: COMMERCIAL

## 2023-11-15 ENCOUNTER — OFFICE VISIT (OUTPATIENT)
Dept: CARDIOLOGY | Facility: CLINIC | Age: 38
End: 2023-11-15
Payer: COMMERCIAL

## 2023-11-15 VITALS
SYSTOLIC BLOOD PRESSURE: 112 MMHG | DIASTOLIC BLOOD PRESSURE: 73 MMHG | OXYGEN SATURATION: 100 % | HEART RATE: 63 BPM | BODY MASS INDEX: 22.91 KG/M2 | HEIGHT: 67 IN | WEIGHT: 146 LBS

## 2023-11-15 DIAGNOSIS — G47.33 OSA (OBSTRUCTIVE SLEEP APNEA): ICD-10-CM

## 2023-11-15 DIAGNOSIS — I49.5 SINUS NODE DYSFUNCTION: ICD-10-CM

## 2023-11-15 DIAGNOSIS — I10 ESSENTIAL HYPERTENSION: Primary | ICD-10-CM

## 2023-11-15 DIAGNOSIS — Z95.0 CARDIAC PACEMAKER IN SITU: Primary | ICD-10-CM

## 2023-11-15 DIAGNOSIS — I48.0 PAROXYSMAL ATRIAL FIBRILLATION: ICD-10-CM

## 2023-11-15 DIAGNOSIS — R55 SYNCOPE AND COLLAPSE: ICD-10-CM

## 2023-11-15 PROCEDURE — 93000 ELECTROCARDIOGRAM COMPLETE: CPT | Performed by: NURSE PRACTITIONER

## 2023-11-15 PROCEDURE — 3074F SYST BP LT 130 MM HG: CPT | Performed by: NURSE PRACTITIONER

## 2023-11-15 PROCEDURE — 99214 OFFICE O/P EST MOD 30 MIN: CPT | Performed by: NURSE PRACTITIONER

## 2023-11-15 PROCEDURE — 93288 INTERROG EVL PM/LDLS PM IP: CPT | Performed by: INTERNAL MEDICINE

## 2023-11-15 PROCEDURE — 1160F RVW MEDS BY RX/DR IN RCRD: CPT | Performed by: NURSE PRACTITIONER

## 2023-11-15 PROCEDURE — 1159F MED LIST DOCD IN RCRD: CPT | Performed by: NURSE PRACTITIONER

## 2023-11-15 PROCEDURE — 3078F DIAST BP <80 MM HG: CPT | Performed by: NURSE PRACTITIONER

## 2023-11-15 RX ORDER — HYDROCHLOROTHIAZIDE 25 MG/1
25 TABLET ORAL DAILY
COMMUNITY

## 2023-11-15 RX ORDER — ASPIRIN 325 MG
81 TABLET ORAL DAILY
COMMUNITY

## 2023-11-15 RX ORDER — FLUOXETINE HCL 20 MG
CAPSULE ORAL
COMMUNITY
Start: 2023-10-04

## 2023-11-15 RX ORDER — DICYCLOMINE HYDROCHLORIDE 10 MG/1
1 CAPSULE ORAL EVERY 6 HOURS
COMMUNITY
Start: 2021-04-14

## 2023-11-15 NOTE — PROGRESS NOTES
[facility]             ALEXANDRIA Colunga Jared K, MD  Antonette Martinez  1985  11/15/2023    Patient Active Problem List   Diagnosis    Palpitations    Pacemaker    Precordial pain    Shortness of breath    Essential hypertension    YARITZA (obstructive sleep apnea)    Fatigue    Cigarette nicotine dependence in remission    Paroxysmal atrial fibrillation    Syncope and collapse    Sinus node dysfunction       Dear Butch Martin MD:    Subjective     Chief Complaint   Patient presents with    Chest Pain    Shortness of Breath       HPI:     This is a 38 y.o. female with known past medical history of paroxysmal atrial fibrillation/flutter status post multiple ablations with open surgical cryoablation, single-chamber permanent pacemaker implantation 4/2019 due to severe sinus node dysfunction, essential hypertension.      Antonette Martinez presents today for routine cardiology follow up.  Patient states she continues to have syncopal episodes.  Her last episode was while she was washing dishes.  Reported some palpitations prior to this occurring.  Denies loss of bowel or bladder control.  Was referred to EP but it appears she no showed her appointments.  Does report some shortness of breath with mild exertion.  Denies any chest pain.  States she saw neurology and had an EEG which was negative for seizures.    Diagnostic Testing  Cardiac event monitor 7/2020: Normal sinus rhythm  Cardiac event monitor 4/2021: Normal sinus rhythm with rare PVCs with episodes of atrial fibrillation  Echocardiogram 5/2022: EF 56 to 60%  Nuclear stress test 5/2022: No evidence of ischemia  Holter monitor 8/2022: Short nonsustained VT/atrial tachycardia with normal average heart rate     All other systems were reviewed and were negative.    Patient Active Problem List   Diagnosis    Palpitations    Pacemaker    Precordial pain    Shortness of breath    Essential hypertension    YARITZA  (obstructive sleep apnea)    Fatigue    Cigarette nicotine dependence in remission    Paroxysmal atrial fibrillation    Syncope and collapse    Sinus node dysfunction       family history includes Arrhythmia in her mother; Heart attack in her maternal grandmother; Heart disease in her maternal grandfather and maternal grandmother; Heart failure in her maternal grandfather; Hyperlipidemia in her father; Hypertension in her father, maternal grandfather, and mother; No Known Problems in her sister.     reports that she quit smoking about 3 years ago. Her smoking use included cigarettes. She smoked an average of .5 packs per day. She has never used smokeless tobacco. She reports that she does not drink alcohol and does not use drugs.    Allergies   Allergen Reactions    Ambien [Zolpidem Tartrate] Hallucinations         Current Outpatient Medications:     apixaban (ELIQUIS) 5 MG tablet tablet, Take 1 tablet by mouth 2 (Two) Times a Day., Disp: 180 tablet, Rfl: 1    aspirin 325 MG tablet, Take 81 mg by mouth Daily., Disp: , Rfl:     dicyclomine (Bentyl) 10 MG capsule, Take 1 capsule by mouth Every 6 (Six) Hours., Disp: , Rfl:     gabapentin (NEURONTIN) 300 MG capsule, Take 1 capsule by mouth 2 (Two) Times a Day., Disp: , Rfl:     hydroCHLOROthiazide (HYDRODIURIL) 25 MG tablet, Take 1 tablet by mouth Daily., Disp: , Rfl:     lisinopril (PRINIVIL,ZESTRIL) 5 MG tablet, Take 1 tablet by mouth Daily., Disp: 90 tablet, Rfl: 1    metoprolol tartrate (LOPRESSOR) 50 MG tablet, Take 1.5 tablets by mouth 2 (Two) Times a Day., Disp: 270 tablet, Rfl: 1    PROzac 20 MG capsule, , Disp: , Rfl:       Physical Exam:  I have reviewed the patient's current vital signs as documented in the patient's EMR.   Vitals:    11/15/23 1432   BP: 112/73   Pulse: 63   SpO2: 100%     Body mass index is 22.86 kg/m².       11/15/23  1432   Weight: 66.2 kg (146 lb)      Physical Exam  Constitutional:       General: She is not in acute distress.      Appearance: Normal appearance. She is well-developed and normal weight.   HENT:      Head: Normocephalic and atraumatic.   Eyes:      General: Lids are normal.      Conjunctiva/sclera: Conjunctivae normal.      Pupils: Pupils are equal, round, and reactive to light.   Neck:      Vascular: No carotid bruit or JVD.   Cardiovascular:      Rate and Rhythm: Normal rate and regular rhythm.      Pulses: Normal pulses.      Heart sounds: Normal heart sounds, S1 normal and S2 normal. No murmur heard.  Pulmonary:      Effort: Pulmonary effort is normal. No respiratory distress.      Breath sounds: Normal breath sounds. No wheezing.   Abdominal:      General: Bowel sounds are normal. There is no distension.      Palpations: Abdomen is soft. There is no hepatomegaly or splenomegaly.      Tenderness: There is no abdominal tenderness.   Musculoskeletal:         General: No swelling. Normal range of motion.      Cervical back: Normal range of motion and neck supple.      Right lower leg: No edema.      Left lower leg: No edema.   Skin:     General: Skin is warm and dry.      Coloration: Skin is not jaundiced.      Findings: No rash.   Neurological:      General: No focal deficit present.      Mental Status: She is alert and oriented to person, place, and time. Mental status is at baseline.   Psychiatric:         Mood and Affect: Mood normal.         Speech: Speech normal.         Behavior: Behavior normal.         Thought Content: Thought content normal.         Judgment: Judgment normal.            DATA REVIEWED:     TTE/KOLTON:  Results for orders placed during the hospital encounter of 07/14/22    Adult Transthoracic Echo Complete w/ Color, Spectral and Contrast if necessary per protocol    Interpretation Summary  · Left ventricular ejection fraction appears to be 56 - 60%. Left ventricular systolic function is normal.  · Left ventricular diastolic function was normal.  · Estimated right ventricular systolic pressure from  "tricuspid regurgitation is mildly elevated (35-45 mmHg).  · Pacemaker leads seen at the right atrium and right ventricle.      Laboratory evaluations:    No results found for: \"GLUCOSE\", \"BUN\", \"CREATININE\", \"EGFRIFNONA\", \"EGFRIFAFRI\", \"BCR\", \"K\", \"CO2\", \"CALCIUM\", \"PROTENTOTREF\", \"ALBUMIN\", \"LABIL2\", \"BILIRUBIN\", \"AST\", \"ALT\"  No results found for: \"WBC\", \"HGB\", \"HCT\", \"MCV\", \"PLT\"  No results found for: \"CHOL\", \"CHLPL\", \"TRIG\", \"HDL\", \"LDL\", \"LDLDIRECT\"  No results found for: \"TSH\", \"B1UXVCB\", \"U8RACQH\", \"THYROIDAB\"  No results found for: \"HGBA1C\"  No results found for: \"ALT\"  No results found for: \"HGBA1C\"  No results found for: \"GLUF\", \"MICROALBUR\", \"CREATININE\"  No results found for: \"IRON\", \"TIBC\", \"FERRITIN\"  No results found for: \"INR\", \"PROTIME\"          ECG 12 Lead    Date/Time: 11/15/2023 3:38 PM  Performed by: Zena Lockwood APRN    Authorized by: Zena Lockwood APRN  Comparison: compared with previous ECG   Rhythm: paced  Other findings: non-specific ST-T wave changes    Clinical impression: non-specific ECG  Comments: Atrial paced rhythm            --------------------------------------------------------------------------------------------------------------------------    ASSESSMENT/PLAN:      Diagnosis Plan   1. Essential hypertension        2. Sinus node dysfunction  Ambulatory Referral to Cardiac Electrophysiology    ECG 12 Lead      3. Syncope and collapse  Ambulatory Referral to Cardiac Electrophysiology      4. YARITZA (obstructive sleep apnea)        5. Paroxysmal atrial fibrillation  ECG 12 Lead          Syncope  Permanent pacemaker implantation  Palpitations  Continues to have syncopal episodes.  Pacemaker interrogation today showed episodes of tachycardia but no significant bradycardia.  She was referred to EP at her last visit after discussion with Dr. Gregory who recommended referral and follow-up for discussion of possible upgrade to dual-chamber pacemaker given her " persistent syncope however she no showed her appointments.  I discussed with her and we will refer her to their office. Discussed case with Dr. Boswell who agrees with EP referral.   Did discuss with her about possible monitor however she states she is unable to pay for this as she has had monitors before and insurance told her they will no longer approve due to her owing money for her last monitor.       YARITZA  Was unable to afford CPAP.      This document has been @Electronically signed by ALEXANDRIA Colunga, 11/15/23, 3:11 PM EST.       Dictated Utilizing Dragon Dictation: Part of this note may be an electronic transcription/translation of spoken language to printed text using the Dragon Dictation System.    Follow-up appointment and medication changes provided in hand delivered After Visit Summary as well as reviewed in the room.

## 2023-12-20 ENCOUNTER — OFFICE VISIT (OUTPATIENT)
Dept: CARDIOLOGY | Facility: CLINIC | Age: 38
End: 2023-12-20
Payer: COMMERCIAL

## 2023-12-20 VITALS
HEIGHT: 67 IN | BODY MASS INDEX: 21.66 KG/M2 | WEIGHT: 138 LBS | SYSTOLIC BLOOD PRESSURE: 112 MMHG | DIASTOLIC BLOOD PRESSURE: 78 MMHG | HEART RATE: 101 BPM | OXYGEN SATURATION: 98 %

## 2023-12-20 DIAGNOSIS — Z95.0 PRESENCE OF CARDIAC PACEMAKER: ICD-10-CM

## 2023-12-20 DIAGNOSIS — Z79.01 CHRONIC ANTICOAGULATION: ICD-10-CM

## 2023-12-20 DIAGNOSIS — I48.0 AF (PAROXYSMAL ATRIAL FIBRILLATION): ICD-10-CM

## 2023-12-20 DIAGNOSIS — R55 VASOVAGAL REACTION: ICD-10-CM

## 2023-12-20 DIAGNOSIS — I49.5 SINUS NODE DYSFUNCTION: Primary | ICD-10-CM

## 2023-12-20 NOTE — PROGRESS NOTES
Cardiac Electrophysiology Outpatient Consult Note            [facility]    Consult Note     Antonette Martinez  [id number]  12/20/2023  [phone]     Primary Care Physician: Butch Martin MD    Referred By: Zena Lockwood*    Subjective     Chief Complaint:   Diagnoses and all orders for this visit:    1. Sinus node dysfunction (Primary)    2. AF (paroxysmal atrial fibrillation)    3. Chronic anticoagulation    4. Vasovagal reaction    5. Presence of cardiac pacemaker      Chief Complaint   Patient presents with    sinus node dysfunction        History of Present Illness:   Antonette Martinez is a 38 y.o. female who presents to my electrophysiology clinic for evaluation of above complaints.  Antonette passes out.  Her story starts back in 2010 when she started having syncope.  She was in her late 20s at that time.  She was diagnosed with anxiety at that point.  She switched primary care physicians at that point and then was diagnosed apparently with some sort of rapid heart rate for which she sought EP care at [facility].  Patient tells me that she was told that she had inappropriate sinus tachycardia.  She at some point was also told that she had atrial fibrillation.  She underwent no less than 6 ablation procedures she states for inappropriate sinus tachycardia.  She underwent also open heart surgery at [facility].  She does not know why.  After this she needed a pacemaker.  She had a pacemaker placed at that point.  She has continued to have episodes of syncope both before during and after all of this.    She says that when she had an episode of syncope she is knows it is coming she was feels flushed warm all over she feels sweaty she feels woozy and dizzy she tries to sit down and often does.  She is extremely infrequently passed out while sitting down almost always at standing.  She is never had any significant injury no lacerations broken bones and  has had more than 100 episodes in her life.  Afterwards she feels profoundly exhausted the entire.  Getting a pacemaker did not help this.  She does not exercise much.  She has chronic renal disease and is on a diuretic.    She was told by her neurologist and neurosurgeon that she has had a stroke in the past.  This combined with her history of atrial fibrillation is why she was placed on anticoagulation by one of her many cardiologist previously.      Past Medical History:   Past Medical History:   Diagnosis Date    Cardiac arrhythmia     Chronic kidney disease     hx of renal failure x2    DDD (degenerative disc disease), lumbar     Fibromyalgia     Heart murmur     Hypertension     Mitral valve prolapse     Osteoporosis     Pacemaker 2011       Past Surgical History:   Past Surgical History:   Procedure Laterality Date    APPENDECTOMY      CARDIAC ABLATION      X6    OVARIAN CYST REMOVAL      PACEMAKER IMPLANTATION  2011    TONSILLECTOMY AND ADENOIDECTOMY         Family History:   Family History   Problem Relation Age of Onset    Hypertension Mother     Arrhythmia Mother     Hypertension Father     Hyperlipidemia Father     No Known Problems Sister     Heart attack Maternal Grandmother     Heart disease Maternal Grandmother     Heart disease Maternal Grandfather     Heart failure Maternal Grandfather     Hypertension Maternal Grandfather        Social History:   Social History     Socioeconomic History    Marital status: Other   Tobacco Use    Smoking status: Former     Packs/day: .5     Types: Cigarettes     Quit date: 08/2020     Years since quitting: 3.3    Smokeless tobacco: Never   Vaping Use    Vaping Use: Never used   Substance and Sexual Activity    Alcohol use: Never    Drug use: Never    Sexual activity: Defer       Medications:     Current Outpatient Medications:     apixaban (ELIQUIS) 5 MG tablet tablet, Take 1 tablet by mouth 2 (Two) Times a Day., Disp: 180 tablet, Rfl: 1    Aspirin 81 MG capsule,  "Take 81 mg by mouth Daily., Disp: , Rfl:     gabapentin (NEURONTIN) 300 MG capsule, Take 1 capsule by mouth 2 (Two) Times a Day., Disp: , Rfl:     hydroCHLOROthiazide (HYDRODIURIL) 25 MG tablet, Take 1 tablet by mouth Daily., Disp: , Rfl:     lisinopril (PRINIVIL,ZESTRIL) 5 MG tablet, Take 1 tablet by mouth Daily., Disp: 90 tablet, Rfl: 1    metoprolol tartrate (LOPRESSOR) 50 MG tablet, Take 1.5 tablets by mouth 2 (Two) Times a Day., Disp: 270 tablet, Rfl: 1    PROzac 20 MG capsule, , Disp: , Rfl:     dicyclomine (Bentyl) 10 MG capsule, Take 1 capsule by mouth Every 6 (Six) Hours., Disp: , Rfl:     Allergies:   Allergies   Allergen Reactions    Zolpidem Hallucinations and Unknown (See Comments)    Ambien [Zolpidem Tartrate] Hallucinations    Aminobenzoate Unknown - Low Severity       Objective   Vital Signs:   Vitals:    12/20/23 1154   BP: 112/78   BP Location: Left arm   Patient Position: Sitting   Pulse: 101   SpO2: 98%   Weight: 62.6 kg (138 lb)   Height: 170.2 cm (67\")       PHYSICAL EXAM  General appearance: Awake, alert, cooperative  Head: Normocephalic, without obvious abnormality, atraumatic  Eyes: Conjunctivae/corneas clear, EOMs intact  Neck: no adenopathy, no carotid bruit, no JVD, and thyroid: not enlarged  Lungs: clear to auscultation bilaterally and no rhonchi or crackles\", ' symmetric  Heart: regular rate and rhythm, S1, S2 normal, no murmur, click, rub or gallop  Abdomen: Soft, non-tender, bowel sounds normal,  no organomegaly  Extremities: extremities normal, atraumatic, no cyanosis or edema  Skin: Skin color, turgor normal, no rashes or lesions  Neurologic: Grossly normal     No results found for: \"GLUCOSE\", \"CALCIUM\", \"NA\", \"K\", \"CO2\", \"CL\", \"BUN\", \"CREATININE\", \"EGFRIFAFRI\", \"EGFRIFNONA\", \"BCR\", \"ANIONGAP\"  No results found for: \"WBC\", \"HGB\", \"HCT\", \"MCV\", \"PLT\"  No results found for: \"INR\", \"PROTIME\"  No results found for: \"TSH\", \"I9LXSPO\", \"P2DUUDT\", \"THYROIDAB\"    Cardiac Testing:      I " personally viewed and interpreted the patient's EKG/Telemetry/lab data    Procedures    Tobacco Cessation: N/A  Obstructive Sleep Apnea Screening: Completed    Advance Care Planning   ACP discussion was declined by the patient. Patient does not have an advance directive, declines further assistance.       Assessment & Plan    Diagnoses and all orders for this visit:    1. Sinus node dysfunction (Primary)    2. AF (paroxysmal atrial fibrillation)    3. Chronic anticoagulation    4. Vasovagal reaction    5. Presence of cardiac pacemaker         Diagnosis Plan   1. Sinus node dysfunction  Single-chamber transvenous [employer] pacemaker.  Atrial pacing support.  Atrially paced the majority of the time.  Underlying rhythm is sinus bradycardia.      2. AF (paroxysmal atrial fibrillation)  No evidence of A-fib on device interrogation.  That said [id number] equals 3 due to hypertension and stroke history.  Continuing anticoagulation at least at this point  .      3. Chronic anticoagulation  Tolerating anticoagulation well      4. Vasovagal reaction  Clearly her symptoms of recurrent syncope or vasovagal in origin.  She had them before she underwent all of her cardiac procedures and continues to have the now.  She continues to have them even with a pacemaker.  There is nothing to suggest that upgrading her from a single to a dual-chamber permanent pacemaker would do anything positive and I would recommend that we not do this at this point.    She is quite relieved to hear my recommendation that we do not upgrade her pacemaker to a dual-chamber system.  She does not have any indications for dual-chamber pacing at this time.    She wishes to come back and see me in follow-up here for further device checks.  I will see her back in 6 months time.    In the meantime we will try and get some of the operative records from her various surgical procedures at [facility].      5. Presence of cardiac pacemaker  [employer] single-chamber transvenous permanent pacemaker.  Appropriate pacing sensing and impedance values..        Body mass index is 21.61 kg/m².    I spent 48 minutes in consultation with this patient which included more than 65% of this time in direct face-to-face counseling, physical examination and discussion of my assessment and findings and this shared decision making with the patient.  The remainder of the time not spent face-to-face was performing one, some or all of the following actions: preparing to see the patient (e.g. reviewing tests, prior clinicians' notes, etc), ordering medications, tests or procedures, coordination of care, discussion of the plan with other healthcare providers, documenting clinical information in epic as well as independently interpreting results and communication of these results to the patient family and/or caregiver(s).  Please note that this explicitly excludes time spent on other separate billable services such as performing procedures or test interpretation, when applicable.    Follow Up:       Thank you for allowing me to participate in the care of your patient. Please to not hesitate to contact me with additional questions or concerns.      Clarence Pappas DO, FACC, RS  Cardiac Electrophysiologist  [facility] / [facility]

## 2024-03-25 ENCOUNTER — OFFICE VISIT (OUTPATIENT)
Dept: CARDIOLOGY | Facility: CLINIC | Age: 39
End: 2024-03-25
Payer: COMMERCIAL

## 2024-03-25 VITALS
SYSTOLIC BLOOD PRESSURE: 154 MMHG | HEIGHT: 67 IN | WEIGHT: 144 LBS | BODY MASS INDEX: 22.6 KG/M2 | OXYGEN SATURATION: 99 % | HEART RATE: 74 BPM | DIASTOLIC BLOOD PRESSURE: 81 MMHG

## 2024-03-25 DIAGNOSIS — I48.0 PAROXYSMAL ATRIAL FIBRILLATION: Primary | ICD-10-CM

## 2024-03-25 DIAGNOSIS — R55 VASOVAGAL REACTION: ICD-10-CM

## 2024-03-25 DIAGNOSIS — I49.5 SINUS NODE DYSFUNCTION: ICD-10-CM

## 2024-03-25 DIAGNOSIS — R06.02 SHORTNESS OF BREATH: ICD-10-CM

## 2024-03-25 DIAGNOSIS — R00.2 PALPITATIONS: ICD-10-CM

## 2024-03-25 DIAGNOSIS — F17.211 CIGARETTE NICOTINE DEPENDENCE IN REMISSION: ICD-10-CM

## 2024-03-25 DIAGNOSIS — Z79.01 CHRONIC ANTICOAGULATION: ICD-10-CM

## 2024-03-25 DIAGNOSIS — I10 ESSENTIAL HYPERTENSION: ICD-10-CM

## 2024-03-25 DIAGNOSIS — Z95.0 PRESENCE OF CARDIAC PACEMAKER: ICD-10-CM

## 2024-03-25 DIAGNOSIS — G47.33 OSA (OBSTRUCTIVE SLEEP APNEA): ICD-10-CM

## 2024-03-25 PROCEDURE — 3077F SYST BP >= 140 MM HG: CPT | Performed by: SPECIALIST

## 2024-03-25 PROCEDURE — 3079F DIAST BP 80-89 MM HG: CPT | Performed by: SPECIALIST

## 2024-03-25 PROCEDURE — 99214 OFFICE O/P EST MOD 30 MIN: CPT | Performed by: SPECIALIST

## 2024-03-25 RX ORDER — HYDROCHLOROTHIAZIDE 25 MG/1
25 TABLET ORAL DAILY
Qty: 90 TABLET | Refills: 1 | Status: SHIPPED | OUTPATIENT
Start: 2024-03-25

## 2024-03-25 RX ORDER — METOPROLOL TARTRATE 100 MG/1
100 TABLET ORAL 2 TIMES DAILY
Qty: 180 TABLET | Refills: 1 | Status: SHIPPED | OUTPATIENT
Start: 2024-03-25

## 2024-03-25 RX ORDER — METOPROLOL TARTRATE 50 MG/1
100 TABLET, FILM COATED ORAL 2 TIMES DAILY
Qty: 180 TABLET | Refills: 1 | Status: SHIPPED | OUTPATIENT
Start: 2024-03-25 | End: 2024-03-25 | Stop reason: SDUPTHER

## 2024-03-25 NOTE — PROGRESS NOTES
Subjective   Follow up, pacemaker, PAF  Antonette Martinez is a 38 y.o. female who presents to [facility] for Med Management and Follow-up (Routine f/up. ).    CHIEF COMPLIANT  Chief Complaint   Patient presents with    Med Management    Follow-up     Routine f/up.        Active Problems:  Problem List Items Addressed This Visit          Cardiac and Vasculature    Palpitations    Relevant Medications    metoprolol tartrate (LOPRESSOR) 100 MG tablet    Other Relevant Orders    Adult Transthoracic Echo Complete w/ Color, Spectral and Contrast if necessary per protocol    Cardiac Event Monitor    Presence of cardiac pacemaker    Essential hypertension    Relevant Medications    hydroCHLOROthiazide 25 MG tablet    metoprolol tartrate (LOPRESSOR) 100 MG tablet    Other Relevant Orders    Lipid Panel    Comprehensive Metabolic Panel    Paroxysmal atrial fibrillation - Primary    Overview     Cardiac event monitor 4/2021: Normal sinus rhythm with rare PVCs with episodes of atrial fibrillation  Echocardiogram 5/2022: EF 56 to 60%  Nuclear stress test 5/2022: No evidence of ischemia  Holter monitor 8/2022: Short nonsustained VT/atrial tachycardia with normal average heart rate         Relevant Medications    apixaban (ELIQUIS) 5 MG tablet tablet    metoprolol tartrate (LOPRESSOR) 100 MG tablet    Other Relevant Orders    CBC & Differential    Sinus node dysfunction    Relevant Medications    metoprolol tartrate (LOPRESSOR) 100 MG tablet       Coag and Thromboembolic    Chronic anticoagulation       Pulmonary and Pneumonias    Shortness of breath    Relevant Orders    Adult Transthoracic Echo Complete w/ Color, Spectral and Contrast if necessary per protocol       Sleep    YARITZA (obstructive sleep apnea)    Relevant Orders    Ambulatory Referral to Pulmonology       Symptoms and Signs    Vasovagal reaction       Tobacco    Cigarette nicotine dependence in remission       HPI  HPI  For the last 2 months he has not been doing very well she  is notes her blood pressure is high sometimes goes up to 200 systolic diastolic also can be up to 100 also notes lots of palpitations which happens daily which can last for few hours associated with significant shortness of breath, she did also have had edema but this has resolved  PRIOR MEDS  Current Outpatient Medications on File Prior to Visit   Medication Sig Dispense Refill    [DISCONTINUED] apixaban (ELIQUIS) 5 MG tablet tablet Take 1 tablet by mouth 2 (Two) Times a Day. 180 tablet 1    [DISCONTINUED] Aspirin 81 MG capsule Take 81 mg by mouth Daily.      [DISCONTINUED] hydroCHLOROthiazide (HYDRODIURIL) 25 MG tablet Take 1 tablet by mouth Daily.      [DISCONTINUED] metoprolol tartrate (LOPRESSOR) 50 MG tablet Take 1.5 tablets by mouth 2 (Two) Times a Day. 270 tablet 1    [DISCONTINUED] dicyclomine (Bentyl) 10 MG capsule Take 1 capsule by mouth Every 6 (Six) Hours. (Patient not taking: Reported on 3/25/2024)      [DISCONTINUED] gabapentin (NEURONTIN) 300 MG capsule Take 1 capsule by mouth 2 (Two) Times a Day. (Patient not taking: Reported on 3/25/2024)      [DISCONTINUED] lisinopril (PRINIVIL,ZESTRIL) 5 MG tablet Take 1 tablet by mouth Daily. (Patient not taking: Reported on 3/25/2024) 90 tablet 1    [DISCONTINUED] PROzac 20 MG capsule  (Patient not taking: Reported on 3/25/2024)       No current facility-administered medications on file prior to visit.       ALLERGIES  Zolpidem, Ambien [zolpidem tartrate], and Aminobenzoate    HISTORY  Past Medical History:   Diagnosis Date    Cardiac arrhythmia     Chronic kidney disease     hx of renal failure x2    DDD (degenerative disc disease), lumbar     Fibromyalgia     Heart murmur     Hypertension     Mitral valve prolapse     Osteoporosis     Pacemaker 2011       Social History     Socioeconomic History    Marital status: Other   Tobacco Use    Smoking status: Former     Current packs/day: 0.00     Types: Cigarettes     Quit date: 08/2020     Years since quitting:  "3.6    Smokeless tobacco: Never   Vaping Use    Vaping status: Never Used   Substance and Sexual Activity    Alcohol use: Never    Drug use: Never    Sexual activity: Defer       Family History   Problem Relation Age of Onset    Hypertension Mother     Arrhythmia Mother     Hypertension Father     Hyperlipidemia Father     No Known Problems Sister     Heart attack Maternal Grandmother     Heart disease Maternal Grandmother     Heart disease Maternal Grandfather     Heart failure Maternal Grandfather     Hypertension Maternal Grandfather        Review of Systems   Respiratory:  Positive for shortness of breath. Negative for apnea, cough, choking, chest tightness, wheezing and stridor.    Cardiovascular:  Positive for palpitations and leg swelling. Negative for chest pain.       Objective     VITALS: /81 (BP Location: Left arm, Patient Position: Sitting, Cuff Size: Adult)   Pulse 74   Ht 170.2 cm (67\")   Wt 65.3 kg (144 lb)   SpO2 99%   BMI 22.55 kg/m²     LABS:   Lab Results (most recent)       None            IMAGING:   No Images in the past 120 days found..    EXAM:  Physical Exam  Vitals reviewed.   Constitutional:       Appearance: She is well-developed.   HENT:      Head: Normocephalic and atraumatic.   Eyes:      Pupils: Pupils are equal, round, and reactive to light.   Neck:      Thyroid: No thyromegaly.      Vascular: No JVD.   Cardiovascular:      Rate and Rhythm: Normal rate and regular rhythm.      Heart sounds: Normal heart sounds. No murmur heard.     No friction rub. No gallop.      Comments: Pacer is nontender  Pulmonary:      Effort: Pulmonary effort is normal. No respiratory distress.      Breath sounds: Normal breath sounds. No stridor. No wheezing or rales.   Chest:      Chest wall: No tenderness.   Musculoskeletal:         General: No tenderness or deformity.      Cervical back: Neck supple.   Skin:     General: Skin is warm and dry.   Neurological:      Mental Status: She is alert and " oriented to person, place, and time.      Cranial Nerves: No cranial nerve deficit.      Coordination: Coordination normal.         Procedure   Procedures       Assessment & Plan     Diagnoses and all orders for this visit:    1. Paroxysmal atrial fibrillation (Primary)  -     apixaban (ELIQUIS) 5 MG tablet tablet; Take 1 tablet by mouth 2 (Two) Times a Day.  Dispense: 180 tablet; Refill: 1  -     CBC & Differential; Future    2. Shortness of breath  -     Adult Transthoracic Echo Complete w/ Color, Spectral and Contrast if necessary per protocol; Future    3. Chronic anticoagulation    4. Presence of cardiac pacemaker    5. Palpitations  -     Adult Transthoracic Echo Complete w/ Color, Spectral and Contrast if necessary per protocol; Future  -     Cardiac Event Monitor; Future  -     Discontinue: metoprolol tartrate (LOPRESSOR) 50 MG tablet; Take 2 tablets by mouth 2 (Two) Times a Day.  Dispense: 180 tablet; Refill: 1  -     Discontinue: metoprolol tartrate (LOPRESSOR) 50 MG tablet; Take 2 tablets by mouth 2 (Two) Times a Day.  Dispense: 180 tablet; Refill: 1  -     metoprolol tartrate (LOPRESSOR) 100 MG tablet; Take 1 tablet by mouth 2 (Two) Times a Day.  Dispense: 180 tablet; Refill: 1    6. Essential hypertension  -     hydroCHLOROthiazide 25 MG tablet; Take 1 tablet by mouth Daily.  Dispense: 90 tablet; Refill: 1  -     Lipid Panel; Future  -     Comprehensive Metabolic Panel; Future    7. Sinus node dysfunction    8. Cigarette nicotine dependence in remission    9. Vasovagal reaction    10. YARITZA (obstructive sleep apnea)  -     Ambulatory Referral to Pulmonology    1.  Apparently in the last 2 months she is having much more episodes of palpitations the last time when she was seen by the EP back in December with the pacemaker interrogation there was no arrhythmias seen however as now she is much more symptomatic and going to repeat the event monitor also going to repeat the echocardiogram to assess cardiac  function because of shortness of breath  2.  Now her blood pressure is elevated I am going to increase the dose of metoprolol to 100 mg p.o. twice daily unfortunately not have any recent blood work we will consider adding an ACE or ARB but I would like to check her potassium and creatinine first  3.  I will get labs including lipids and CMP and CBC will continue with anticoagulation because she has history of A-fib before  4.  She has not been using CPAP she is to use it before but apparently her insurance now is not paying for it so I am going to refer her to pulmonology to try to get the CPAP back  5.  She is still not smoking      Return in about 6 weeks (around 5/6/2024).                 MEDS ORDERED DURING VISIT:  New Medications Ordered This Visit   Medications    apixaban (ELIQUIS) 5 MG tablet tablet     Sig: Take 1 tablet by mouth 2 (Two) Times a Day.     Dispense:  180 tablet     Refill:  1    hydroCHLOROthiazide 25 MG tablet     Sig: Take 1 tablet by mouth Daily.     Dispense:  90 tablet     Refill:  1    metoprolol tartrate (LOPRESSOR) 100 MG tablet     Sig: Take 1 tablet by mouth 2 (Two) Times a Day.     Dispense:  180 tablet     Refill:  1       As always, Butch Martin MD  I appreciate very much the opportunity to participate in the cardiovascular care of your patients. Please do not hesitate to call me with any questions with regards to Antonette Martinez evaluation and management.         This document has been electronically signed by Tami Gregory MD  March 25, 2024 14:29 EDT    This note is dictated utilizing voice recognition software.

## 2024-10-02 ENCOUNTER — TELEPHONE (OUTPATIENT)
Dept: CARDIOLOGY | Facility: CLINIC | Age: 39
End: 2024-10-02
Payer: COMMERCIAL

## 2024-10-02 NOTE — TELEPHONE ENCOUNTER
Missed remote trasnmission. Called patient, no answer. Letter sent via "[employer]. Second attempt to contact patient in regards to disconnect home monitor.

## 2024-11-20 ENCOUNTER — TELEPHONE (OUTPATIENT)
Dept: CARDIOLOGY | Facility: CLINIC | Age: 39
End: 2024-11-20

## 2025-02-18 ENCOUNTER — TELEPHONE (OUTPATIENT)
Dept: CARDIOLOGY | Facility: CLINIC | Age: 40
End: 2025-02-18
Payer: COMMERCIAL

## 2025-02-18 NOTE — TELEPHONE ENCOUNTER
CALLED PATIENT UNABLE TO LVM TO LET HER KNOW HER APPT HAS BEEN MOVED TO 11:15AM INSTEAD OF 2:15PM DUE TO MDT REP NEEDING TO CHECK HER PACEMAKER AND WILL BE THERE IN THE MORNING INSTEAD OF THE AFTERNOON. WILL KEEP TRYING TO MAKE PATIENT AWARE PHONE KEEPS RINGING UNABLE TO LVM.

## 2025-02-19 NOTE — TELEPHONE ENCOUNTER
CALLED PATIENT TO MAKE AWARE THAT THE [facility] OFFICE IS CLOSED TODAY 2/19/25 DUE TO WEATHER. PATIENT IS AWARE OF NEW APPT.

## 2025-07-09 LAB
MC_CV_MDC_IDC_RATE_1: 140
MC_CV_MDC_IDC_ZONE_ID: 1
MDC_IDC_MSMT_BATTERY_REMAINING_LONGEVITY: 18 MO
MDC_IDC_MSMT_BATTERY_REMAINING_PERCENTAGE: 25 %
MDC_IDC_MSMT_BATTERY_STATUS: NORMAL
MDC_IDC_MSMT_LEADCHNL_RA_DTM: NORMAL
MDC_IDC_MSMT_LEADCHNL_RA_IMPEDANCE_VALUE: 874
MDC_IDC_MSMT_LEADCHNL_RA_PACING_THRESHOLD_POLARITY: NORMAL
MDC_IDC_MSMT_LEADCHNL_RA_SENSING_INTR_AMPL: 1.7
MDC_IDC_PG_IMPLANT_DTM: NORMAL
MDC_IDC_PG_MFG: NORMAL
MDC_IDC_PG_MODEL: NORMAL
MDC_IDC_PG_SERIAL: NORMAL
MDC_IDC_PG_TYPE: NORMAL
MDC_IDC_SESS_DTM: NORMAL
MDC_IDC_SESS_TYPE: NORMAL
MDC_IDC_SET_BRADY_LOWRATE: 75
MDC_IDC_SET_BRADY_MAX_SENSOR_RATE: 130
MDC_IDC_SET_BRADY_MODE: NORMAL
MDC_IDC_SET_LEADCHNL_RA_PACING_AMPLITUDE: 3
MDC_IDC_SET_LEADCHNL_RA_PACING_POLARITY: NORMAL
MDC_IDC_SET_LEADCHNL_RA_PACING_PULSEWIDTH: 0.4
MDC_IDC_SET_LEADCHNL_RA_SENSING_POLARITY: NORMAL
MDC_IDC_SET_LEADCHNL_RA_SENSING_SENSITIVITY: 0.25
MDC_IDC_SET_ZONE_STATUS: NORMAL
MDC_IDC_SET_ZONE_TYPE: NORMAL
MDC_IDC_STAT_BRADY_RA_PERCENT_PACED: 79
